# Patient Record
Sex: FEMALE | Race: WHITE | Employment: UNEMPLOYED | URBAN - METROPOLITAN AREA
[De-identification: names, ages, dates, MRNs, and addresses within clinical notes are randomized per-mention and may not be internally consistent; named-entity substitution may affect disease eponyms.]

---

## 2017-02-27 RX ORDER — DILTIAZEM HYDROCHLORIDE 360 MG/1
CAPSULE, EXTENDED RELEASE ORAL
Qty: 90 CAPSULE | Refills: 0 | Status: SHIPPED | OUTPATIENT
Start: 2017-02-27 | End: 2017-06-28 | Stop reason: SDUPTHER

## 2017-04-03 RX ORDER — HYDROCHLOROTHIAZIDE 50 MG/1
50 TABLET ORAL DAILY
Qty: 90 TABLET | Refills: 0 | Status: SHIPPED | OUTPATIENT
Start: 2017-04-03 | End: 2017-06-28 | Stop reason: SDUPTHER

## 2017-04-03 RX ORDER — LEVOTHYROXINE SODIUM 125 MCG
125 TABLET ORAL DAILY
Qty: 90 TABLET | Refills: 0 | Status: SHIPPED | OUTPATIENT
Start: 2017-04-03 | End: 2017-10-18 | Stop reason: SDUPTHER

## 2017-05-08 RX ORDER — LEVOTHYROXINE SODIUM 125 MCG
125 TABLET ORAL DAILY
Qty: 90 TABLET | Refills: 0 | Status: SHIPPED | OUTPATIENT
Start: 2017-05-08 | End: 2017-07-27 | Stop reason: SDUPTHER

## 2017-06-28 RX ORDER — HYDROCHLOROTHIAZIDE 50 MG/1
50 TABLET ORAL DAILY
Qty: 90 TABLET | Refills: 0 | Status: SHIPPED | OUTPATIENT
Start: 2017-06-28 | End: 2017-10-08 | Stop reason: SDUPTHER

## 2017-06-28 RX ORDER — DILTIAZEM HYDROCHLORIDE 360 MG/1
CAPSULE, EXTENDED RELEASE ORAL
Qty: 90 CAPSULE | Refills: 0 | Status: SHIPPED | OUTPATIENT
Start: 2017-06-28 | End: 2017-10-02 | Stop reason: SDUPTHER

## 2017-07-27 RX ORDER — LEVOTHYROXINE SODIUM 125 MCG
TABLET ORAL
Qty: 30 TABLET | Refills: 0 | Status: SHIPPED | OUTPATIENT
Start: 2017-07-27 | End: 2017-10-18

## 2017-09-27 RX ORDER — HYDROCHLOROTHIAZIDE 50 MG/1
TABLET ORAL
Qty: 90 TABLET | Refills: 0 | OUTPATIENT
Start: 2017-09-27

## 2017-09-27 RX ORDER — DILTIAZEM HYDROCHLORIDE 360 MG/1
CAPSULE, EXTENDED RELEASE ORAL
Qty: 90 CAPSULE | Refills: 0 | OUTPATIENT
Start: 2017-09-27

## 2017-10-02 DIAGNOSIS — I10 ESSENTIAL HYPERTENSION: Primary | ICD-10-CM

## 2017-10-02 RX ORDER — DILTIAZEM HYDROCHLORIDE 360 MG/1
CAPSULE, EXTENDED RELEASE ORAL
Qty: 30 CAPSULE | Refills: 0 | Status: SHIPPED | OUTPATIENT
Start: 2017-10-02 | End: 2017-10-18 | Stop reason: SDUPTHER

## 2017-10-02 NOTE — TELEPHONE ENCOUNTER
Pts  called. She is completely out of her diltiazem and is wondering why rxn was denied. Told him pt has not been seen in over a year. Sent in a 1 month supply of her diltiazem and advised she needs to be seen ASAP.

## 2017-10-09 RX ORDER — HYDROCHLOROTHIAZIDE 50 MG/1
50 TABLET ORAL DAILY
Qty: 90 TABLET | Refills: 0 | Status: SHIPPED | OUTPATIENT
Start: 2017-10-09 | End: 2017-10-18 | Stop reason: SDUPTHER

## 2017-10-18 ENCOUNTER — OFFICE VISIT (OUTPATIENT)
Dept: FAMILY MEDICINE CLINIC | Age: 57
End: 2017-10-18

## 2017-10-18 VITALS
BODY MASS INDEX: 38.45 KG/M2 | HEIGHT: 63 IN | HEART RATE: 66 BPM | WEIGHT: 217 LBS | OXYGEN SATURATION: 98 % | DIASTOLIC BLOOD PRESSURE: 78 MMHG | SYSTOLIC BLOOD PRESSURE: 120 MMHG

## 2017-10-18 DIAGNOSIS — E03.9 HYPOTHYROIDISM, UNSPECIFIED TYPE: ICD-10-CM

## 2017-10-18 DIAGNOSIS — I10 ESSENTIAL HYPERTENSION: Primary | ICD-10-CM

## 2017-10-18 PROCEDURE — 99214 OFFICE O/P EST MOD 30 MIN: CPT | Performed by: FAMILY MEDICINE

## 2017-10-18 RX ORDER — DILTIAZEM HYDROCHLORIDE 360 MG/1
360 CAPSULE, EXTENDED RELEASE ORAL DAILY
Qty: 90 CAPSULE | Refills: 1 | Status: SHIPPED | OUTPATIENT
Start: 2017-10-18 | End: 2018-04-15 | Stop reason: SDUPTHER

## 2017-10-18 RX ORDER — HYDROCHLOROTHIAZIDE 50 MG/1
50 TABLET ORAL DAILY
Qty: 90 TABLET | Refills: 1 | Status: SHIPPED | OUTPATIENT
Start: 2017-10-18 | End: 2018-06-28 | Stop reason: SDUPTHER

## 2017-10-18 RX ORDER — GABAPENTIN 300 MG/1
900 CAPSULE ORAL 3 TIMES DAILY
Refills: 2 | COMMUNITY
Start: 2017-09-27

## 2017-10-18 RX ORDER — TRIAMCINOLONE ACETONIDE 1 MG/G
CREAM TOPICAL
Qty: 30 G | Refills: 1 | Status: SHIPPED | OUTPATIENT
Start: 2017-10-18 | End: 2018-12-18

## 2017-10-18 RX ORDER — LEVOTHYROXINE SODIUM 125 MCG
125 TABLET ORAL DAILY
Qty: 90 TABLET | Refills: 1 | Status: SHIPPED | OUTPATIENT
Start: 2017-10-18 | End: 2018-04-15 | Stop reason: SDUPTHER

## 2017-10-18 NOTE — PROGRESS NOTES
1 capsule by mouth daily  -     metoprolol tartrate (LOPRESSOR) 25 MG tablet; Take 1 tablet by mouth 2 times daily  -     SYNTHROID 125 MCG tablet; Take 1 tablet by mouth daily  -     triamcinolone (KENALOG) 0.1 % cream; Apply topically 2 times daily.

## 2017-10-19 DIAGNOSIS — I10 ESSENTIAL HYPERTENSION: ICD-10-CM

## 2017-10-19 DIAGNOSIS — E03.9 HYPOTHYROIDISM, UNSPECIFIED TYPE: ICD-10-CM

## 2017-10-19 LAB
A/G RATIO: 1.8 (ref 1.1–2.2)
ALBUMIN SERPL-MCNC: 4.1 G/DL (ref 3.4–5)
ALP BLD-CCNC: 68 U/L (ref 40–129)
ALT SERPL-CCNC: 14 U/L (ref 10–40)
ANION GAP SERPL CALCULATED.3IONS-SCNC: 16 MMOL/L (ref 3–16)
AST SERPL-CCNC: 14 U/L (ref 15–37)
BILIRUB SERPL-MCNC: 0.3 MG/DL (ref 0–1)
BUN BLDV-MCNC: 20 MG/DL (ref 7–20)
CALCIUM SERPL-MCNC: 9.8 MG/DL (ref 8.3–10.6)
CHLORIDE BLD-SCNC: 102 MMOL/L (ref 99–110)
CHOLESTEROL, TOTAL: 223 MG/DL (ref 0–199)
CO2: 24 MMOL/L (ref 21–32)
CREAT SERPL-MCNC: 1 MG/DL (ref 0.6–1.1)
GFR AFRICAN AMERICAN: >60
GFR NON-AFRICAN AMERICAN: 57
GLOBULIN: 2.3 G/DL
GLUCOSE BLD-MCNC: 91 MG/DL (ref 70–99)
HDLC SERPL-MCNC: 53 MG/DL (ref 40–60)
LDL CHOLESTEROL CALCULATED: 141 MG/DL
POTASSIUM SERPL-SCNC: 4.3 MMOL/L (ref 3.5–5.1)
SODIUM BLD-SCNC: 142 MMOL/L (ref 136–145)
TOTAL PROTEIN: 6.4 G/DL (ref 6.4–8.2)
TRIGL SERPL-MCNC: 144 MG/DL (ref 0–150)
TSH SERPL DL<=0.05 MIU/L-ACNC: 0.45 UIU/ML (ref 0.27–4.2)
VLDLC SERPL CALC-MCNC: 29 MG/DL

## 2017-10-20 LAB
ESTIMATED AVERAGE GLUCOSE: 111.2 MG/DL
HBA1C MFR BLD: 5.5 %

## 2018-01-02 RX ORDER — HYDROCHLOROTHIAZIDE 50 MG/1
50 TABLET ORAL DAILY
Qty: 90 TABLET | Refills: 1 | Status: SHIPPED | OUTPATIENT
Start: 2018-01-02 | End: 2019-09-23 | Stop reason: SDUPTHER

## 2018-04-16 RX ORDER — DILTIAZEM HYDROCHLORIDE 360 MG/1
360 CAPSULE, EXTENDED RELEASE ORAL DAILY
Qty: 90 CAPSULE | Refills: 0 | Status: SHIPPED | OUTPATIENT
Start: 2018-04-16 | End: 2018-07-19 | Stop reason: SDUPTHER

## 2018-04-16 RX ORDER — LEVOTHYROXINE SODIUM 125 MCG
125 TABLET ORAL DAILY
Qty: 90 TABLET | Refills: 0 | Status: SHIPPED | OUTPATIENT
Start: 2018-04-16 | End: 2018-07-23 | Stop reason: SDUPTHER

## 2018-06-28 RX ORDER — HYDROCHLOROTHIAZIDE 50 MG/1
50 TABLET ORAL DAILY
Qty: 90 TABLET | Refills: 0 | Status: SHIPPED | OUTPATIENT
Start: 2018-06-28 | End: 2018-09-28 | Stop reason: SDUPTHER

## 2018-07-19 RX ORDER — DILTIAZEM HYDROCHLORIDE 360 MG/1
360 CAPSULE, EXTENDED RELEASE ORAL DAILY
Qty: 90 CAPSULE | Refills: 0 | Status: SHIPPED | OUTPATIENT
Start: 2018-07-19 | End: 2018-10-18 | Stop reason: SDUPTHER

## 2018-07-19 NOTE — TELEPHONE ENCOUNTER
Last OV - 10/18/2017  Return in about 1 year (around 10/18/2018)  Next OV - No pending appointments   Last filled - 4/16/2018

## 2018-07-23 RX ORDER — LEVOTHYROXINE SODIUM 125 MCG
125 TABLET ORAL DAILY
Qty: 90 TABLET | Refills: 0 | Status: SHIPPED | OUTPATIENT
Start: 2018-07-23 | End: 2018-10-18 | Stop reason: SDUPTHER

## 2018-09-28 RX ORDER — HYDROCHLOROTHIAZIDE 50 MG/1
50 TABLET ORAL DAILY
Qty: 90 TABLET | Refills: 0 | Status: SHIPPED | OUTPATIENT
Start: 2018-09-28 | End: 2018-12-18 | Stop reason: SDUPTHER

## 2018-10-18 RX ORDER — LEVOTHYROXINE SODIUM 125 MCG
125 TABLET ORAL DAILY
Qty: 90 TABLET | Refills: 0 | Status: SHIPPED | OUTPATIENT
Start: 2018-10-18 | End: 2019-01-09

## 2018-10-18 RX ORDER — DILTIAZEM HYDROCHLORIDE 360 MG/1
360 CAPSULE, EXTENDED RELEASE ORAL DAILY
Qty: 90 CAPSULE | Refills: 0 | Status: SHIPPED | OUTPATIENT
Start: 2018-10-18 | End: 2019-01-15 | Stop reason: SDUPTHER

## 2018-10-18 NOTE — TELEPHONE ENCOUNTER
Last Fill 7/23/18  Last Office Visit 10/18/17   Return in about 1 year (around 10/18/2018).    No Pending Appointments

## 2018-11-13 ENCOUNTER — TELEPHONE (OUTPATIENT)
Dept: FAMILY MEDICINE CLINIC | Age: 58
End: 2018-11-13

## 2018-11-13 NOTE — TELEPHONE ENCOUNTER
Patient was exercising at the gym around 11:45 this morning and had a four foot pole fall on her lower abdomen when she was trying to stand up. She immediately urinated on herself. She is experiencing back pain, lower abdominal pressure, painful urination and difficulty standing up (is not able to stand straight). Patient states the pain is severe. Apologized and advised the patient that her best option would be to go to the ER to be physically evaluated. Patient understood.

## 2018-11-13 NOTE — TELEPHONE ENCOUNTER
Pt would like to come in for an appt and would like to come in today. Pt states that she was leaning back with a weight bar (no weights on it) and fell back. Pt states she urinated on herself. I asked the pt if the bar fell on her, one time she said yes and the other time she said no. I'm not really clear on what happened. Pt unwilling to see a NP and doesn't want to go to the ER due to a lot of flu pt's being there. Pt last seen 2017. Please call and advise.

## 2018-12-13 ENCOUNTER — TELEPHONE (OUTPATIENT)
Dept: FAMILY MEDICINE CLINIC | Age: 58
End: 2018-12-13

## 2018-12-13 DIAGNOSIS — S32.040D CLOSED COMPRESSION FRACTURE OF L4 LUMBAR VERTEBRA WITH ROUTINE HEALING, SUBSEQUENT ENCOUNTER: Primary | ICD-10-CM

## 2018-12-13 NOTE — TELEPHONE ENCOUNTER
Pt's spouse calling for the following  Order(s) BONE DENSITY TEST   Reason -PATIENT'S ORTHOPEDIC DOCTOR IS REQUESTING SHE HAS THE TEST DONE ? Also wanted to inform PCP that patient was  Dx -Compression Fx of L-1 Lumbar ?   Please call patient back and advise

## 2018-12-18 ENCOUNTER — OFFICE VISIT (OUTPATIENT)
Dept: FAMILY MEDICINE CLINIC | Age: 58
End: 2018-12-18
Payer: COMMERCIAL

## 2018-12-18 VITALS
OXYGEN SATURATION: 98 % | BODY MASS INDEX: 34.09 KG/M2 | DIASTOLIC BLOOD PRESSURE: 70 MMHG | HEIGHT: 63 IN | WEIGHT: 192.4 LBS | SYSTOLIC BLOOD PRESSURE: 100 MMHG | HEART RATE: 70 BPM

## 2018-12-18 DIAGNOSIS — R73.03 PREDIABETES: ICD-10-CM

## 2018-12-18 DIAGNOSIS — E03.9 HYPOTHYROIDISM, UNSPECIFIED TYPE: ICD-10-CM

## 2018-12-18 DIAGNOSIS — I10 ESSENTIAL HYPERTENSION: ICD-10-CM

## 2018-12-18 DIAGNOSIS — R51.9 ACUTE NONINTRACTABLE HEADACHE, UNSPECIFIED HEADACHE TYPE: Primary | ICD-10-CM

## 2018-12-18 PROCEDURE — 99214 OFFICE O/P EST MOD 30 MIN: CPT | Performed by: FAMILY MEDICINE

## 2018-12-18 RX ORDER — BUPROPION HYDROCHLORIDE 300 MG/1
300 TABLET ORAL
COMMUNITY
Start: 2018-03-29

## 2018-12-18 RX ORDER — DULOXETIN HYDROCHLORIDE 60 MG/1
120 CAPSULE, DELAYED RELEASE ORAL DAILY
COMMUNITY
Start: 2011-12-11

## 2018-12-18 RX ORDER — METFORMIN HYDROCHLORIDE 500 MG/1
2000 TABLET, EXTENDED RELEASE ORAL
COMMUNITY
Start: 2018-03-29

## 2018-12-18 RX ORDER — LANOLIN ALCOHOL/MO/W.PET/CERES
3 CREAM (GRAM) TOPICAL
COMMUNITY

## 2018-12-18 ASSESSMENT — ENCOUNTER SYMPTOMS: TROUBLE SWALLOWING: 0

## 2018-12-18 NOTE — PROGRESS NOTES
adenopathy present. She has no cervical adenopathy. Neurological: She is alert and oriented to person, place, and time. No cranial nerve deficit. Skin: Skin is warm and dry. No rash noted. She is not diaphoretic. Psychiatric: She has a normal mood and affect. Her behavior is normal. Judgment and thought content normal.       Assessment:       Diagnosis Orders   1. Acute nonintractable headache, unspecified headache type     2. Essential hypertension  Lipid Panel    Comprehensive Metabolic Panel   3. Hypothyroidism, unspecified type  TSH without Reflex   4. Prediabetes  Hemoglobin A1C    Comprehensive Metabolic Panel          Plan:      Sachi Kenny was seen today for follow-up from hospital.    Diagnoses and all orders for this visit:    Acute nonintractable headache, unspecified headache type  Resolving. Criteria for emergent care reviewed. To er if worse  differential diagnosis reviewed  Essential hypertension  -     Lipid Panel; Future  -     Comprehensive Metabolic Panel; Future  The current medical regimen is effective;  continue present plan and medications. Hypothyroidism, unspecified type  -     TSH without Reflex; Future  Rechecking to see if stable  Prediabetes  -     Hemoglobin A1C; Future  -     Comprehensive Metabolic Panel;  Future  Has been stable with diet and exercise           Nohemi Barcenas MD

## 2018-12-19 ENCOUNTER — TELEPHONE (OUTPATIENT)
Dept: FAMILY MEDICINE CLINIC | Age: 58
End: 2018-12-19

## 2018-12-19 ENCOUNTER — HOSPITAL ENCOUNTER (OUTPATIENT)
Dept: GENERAL RADIOLOGY | Age: 58
Discharge: HOME | End: 2018-12-19
Payer: COMMERCIAL

## 2018-12-19 DIAGNOSIS — S32.040D CLOSED COMPRESSION FRACTURE OF L4 LUMBAR VERTEBRA WITH ROUTINE HEALING, SUBSEQUENT ENCOUNTER: ICD-10-CM

## 2018-12-19 PROCEDURE — 77080 DXA BONE DENSITY AXIAL: CPT

## 2018-12-20 RX ORDER — HYDROCHLOROTHIAZIDE 50 MG/1
50 TABLET ORAL DAILY
Qty: 90 TABLET | Refills: 0 | Status: SHIPPED | OUTPATIENT
Start: 2018-12-20 | End: 2019-03-16 | Stop reason: SDUPTHER

## 2019-01-03 DIAGNOSIS — I10 ESSENTIAL HYPERTENSION: ICD-10-CM

## 2019-01-03 DIAGNOSIS — R73.03 PREDIABETES: ICD-10-CM

## 2019-01-03 DIAGNOSIS — E03.9 HYPOTHYROIDISM, UNSPECIFIED TYPE: ICD-10-CM

## 2019-01-03 LAB
A/G RATIO: 1.7 (ref 1.1–2.2)
ALBUMIN SERPL-MCNC: 4.3 G/DL (ref 3.4–5)
ALP BLD-CCNC: 80 U/L (ref 40–129)
ALT SERPL-CCNC: 14 U/L (ref 10–40)
ANION GAP SERPL CALCULATED.3IONS-SCNC: 11 MMOL/L (ref 3–16)
AST SERPL-CCNC: 9 U/L (ref 15–37)
BILIRUB SERPL-MCNC: <0.2 MG/DL (ref 0–1)
BUN BLDV-MCNC: 16 MG/DL (ref 7–20)
CALCIUM SERPL-MCNC: 10.1 MG/DL (ref 8.3–10.6)
CHLORIDE BLD-SCNC: 100 MMOL/L (ref 99–110)
CHOLESTEROL, TOTAL: 225 MG/DL (ref 0–199)
CO2: 29 MMOL/L (ref 21–32)
CREAT SERPL-MCNC: 0.8 MG/DL (ref 0.6–1.1)
GFR AFRICAN AMERICAN: >60
GFR NON-AFRICAN AMERICAN: >60
GLOBULIN: 2.5 G/DL
GLUCOSE BLD-MCNC: 102 MG/DL (ref 70–99)
HDLC SERPL-MCNC: 46 MG/DL (ref 40–60)
LDL CHOLESTEROL CALCULATED: 149 MG/DL
POTASSIUM SERPL-SCNC: 4.1 MMOL/L (ref 3.5–5.1)
SODIUM BLD-SCNC: 140 MMOL/L (ref 136–145)
TOTAL PROTEIN: 6.8 G/DL (ref 6.4–8.2)
TRIGL SERPL-MCNC: 152 MG/DL (ref 0–150)
TSH SERPL DL<=0.05 MIU/L-ACNC: 5.75 UIU/ML (ref 0.27–4.2)
VLDLC SERPL CALC-MCNC: 30 MG/DL

## 2019-01-04 LAB
ESTIMATED AVERAGE GLUCOSE: 114 MG/DL
HBA1C MFR BLD: 5.6 %

## 2019-01-09 ENCOUNTER — TELEPHONE (OUTPATIENT)
Dept: FAMILY MEDICINE CLINIC | Age: 59
End: 2019-01-09

## 2019-01-09 RX ORDER — LEVOTHYROXINE SODIUM 137 MCG
137 TABLET ORAL DAILY
Qty: 30 TABLET | Refills: 2 | Status: SHIPPED | OUTPATIENT
Start: 2019-01-09 | End: 2019-04-02 | Stop reason: SDUPTHER

## 2019-01-15 RX ORDER — DILTIAZEM HYDROCHLORIDE 360 MG/1
360 CAPSULE, EXTENDED RELEASE ORAL DAILY
Qty: 90 CAPSULE | Refills: 1 | Status: SHIPPED | OUTPATIENT
Start: 2019-01-15 | End: 2019-07-18 | Stop reason: SDUPTHER

## 2019-01-15 RX ORDER — LEVOTHYROXINE SODIUM 125 MCG
125 TABLET ORAL DAILY
Qty: 90 TABLET | Refills: 1 | Status: SHIPPED | OUTPATIENT
Start: 2019-01-15 | End: 2020-01-07 | Stop reason: SDUPTHER

## 2019-03-18 RX ORDER — HYDROCHLOROTHIAZIDE 50 MG/1
50 TABLET ORAL DAILY
Qty: 90 TABLET | Refills: 1 | Status: SHIPPED | OUTPATIENT
Start: 2019-03-18 | End: 2019-09-23 | Stop reason: SDUPTHER

## 2019-04-02 RX ORDER — LEVOTHYROXINE SODIUM 137 MCG
TABLET ORAL
Qty: 30 TABLET | Refills: 0 | Status: SHIPPED | OUTPATIENT
Start: 2019-04-02 | End: 2019-05-01 | Stop reason: SDUPTHER

## 2019-04-02 NOTE — TELEPHONE ENCOUNTER
Last OV 12/18/2018   Next OV Visit date not found  Last Fill 1/15/2019    Last Thyroid   Lab Results   Component Value Date    TSH 5.75 01/03/2019    FT3 3.1 10/15/2012    A1NVPRQ 1.03 05/29/2014    T4FREE 1.5 05/29/2014    U8UTGUT 5.2 08/19/2010

## 2019-05-01 RX ORDER — LEVOTHYROXINE SODIUM 137 MCG
TABLET ORAL
Qty: 30 TABLET | Refills: 0 | Status: SHIPPED | OUTPATIENT
Start: 2019-05-01 | End: 2019-05-29 | Stop reason: SDUPTHER

## 2019-05-29 RX ORDER — LEVOTHYROXINE SODIUM 137 MCG
TABLET ORAL
Qty: 30 TABLET | Refills: 0 | Status: SHIPPED | OUTPATIENT
Start: 2019-05-29 | End: 2019-06-25 | Stop reason: SDUPTHER

## 2019-05-29 NOTE — TELEPHONE ENCOUNTER
Medication:   Requested Prescriptions     Pending Prescriptions Disp Refills    SYNTHROID 137 MCG tablet [Pharmacy Med Name: SYNTHROID 0.137MG (137MCG) TABLETS] 30 tablet 0     Sig: TAKE 1 TABLET BY MOUTH ONCE DAILY       Last Filled:  5. 1.2019 #30    Patient Phone Number: [phone] (home)     Last appt: 12/18/2018   Next appt: Visit date not found    Last Thyroid:   Lab Results   Component Value Date    TSH 5.75 01/03/2019    FT3 3.1 10/15/2012    R4XISSW 1.03 05/29/2014    T4FREE 1.5 05/29/2014    Y7ABQTZ 5.2 08/19/2010

## 2019-06-26 RX ORDER — LEVOTHYROXINE SODIUM 137 MCG
137 TABLET ORAL DAILY
Qty: 30 TABLET | Refills: 2 | Status: SHIPPED | OUTPATIENT
Start: 2019-06-26 | End: 2019-09-08 | Stop reason: SDUPTHER

## 2019-07-19 RX ORDER — DILTIAZEM HYDROCHLORIDE 360 MG/1
360 CAPSULE, EXTENDED RELEASE ORAL DAILY
Qty: 90 CAPSULE | Refills: 0 | Status: SHIPPED | OUTPATIENT
Start: 2019-07-19 | End: 2019-10-23 | Stop reason: SDUPTHER

## 2019-09-10 RX ORDER — LEVOTHYROXINE SODIUM 137 MCG
137 TABLET ORAL DAILY
Qty: 30 TABLET | Refills: 2 | Status: SHIPPED | OUTPATIENT
Start: 2019-09-10 | End: 2019-12-17 | Stop reason: SDUPTHER

## 2019-09-23 RX ORDER — HYDROCHLOROTHIAZIDE 50 MG/1
50 TABLET ORAL DAILY
Qty: 90 TABLET | Refills: 0 | Status: SHIPPED | OUTPATIENT
Start: 2019-09-23 | End: 2019-12-23

## 2019-10-23 RX ORDER — DILTIAZEM HYDROCHLORIDE 360 MG/1
360 CAPSULE, EXTENDED RELEASE ORAL DAILY
Qty: 90 CAPSULE | Refills: 1 | Status: SHIPPED | OUTPATIENT
Start: 2019-10-23 | End: 2020-04-21

## 2019-12-17 RX ORDER — LEVOTHYROXINE SODIUM 137 MCG
137 TABLET ORAL DAILY
Qty: 30 TABLET | Refills: 0 | Status: SHIPPED | OUTPATIENT
Start: 2019-12-17 | End: 2021-02-12

## 2019-12-23 RX ORDER — HYDROCHLOROTHIAZIDE 50 MG/1
50 TABLET ORAL DAILY
Qty: 90 TABLET | Refills: 1 | Status: SHIPPED | OUTPATIENT
Start: 2019-12-23 | End: 2020-05-18

## 2020-01-07 ENCOUNTER — OFFICE VISIT (OUTPATIENT)
Dept: FAMILY MEDICINE CLINIC | Age: 60
End: 2020-01-07
Payer: COMMERCIAL

## 2020-01-07 VITALS
OXYGEN SATURATION: 97 % | HEART RATE: 58 BPM | DIASTOLIC BLOOD PRESSURE: 68 MMHG | SYSTOLIC BLOOD PRESSURE: 120 MMHG | HEIGHT: 63 IN | WEIGHT: 220.4 LBS | BODY MASS INDEX: 39.05 KG/M2

## 2020-01-07 LAB
BILIRUBIN, POC: NEGATIVE
BLOOD URINE, POC: NORMAL
CLARITY, POC: NORMAL
COLOR, POC: NORMAL
GLUCOSE URINE, POC: NEGATIVE
KETONES, POC: NEGATIVE
LEUKOCYTE EST, POC: NORMAL
NITRITE, POC: POSITIVE
PH, POC: 6.5
PROTEIN, POC: NEGATIVE
SPECIFIC GRAVITY, POC: 1.02
UROBILINOGEN, POC: 0.2

## 2020-01-07 PROCEDURE — 81002 URINALYSIS NONAUTO W/O SCOPE: CPT | Performed by: FAMILY MEDICINE

## 2020-01-07 PROCEDURE — 99214 OFFICE O/P EST MOD 30 MIN: CPT | Performed by: FAMILY MEDICINE

## 2020-01-07 RX ORDER — NITROFURANTOIN 25; 75 MG/1; MG/1
100 CAPSULE ORAL 2 TIMES DAILY
Qty: 14 CAPSULE | Refills: 0 | Status: SHIPPED | OUTPATIENT
Start: 2020-01-07 | End: 2020-01-14

## 2020-01-07 RX ORDER — LEVOTHYROXINE SODIUM 125 MCG
125 TABLET ORAL DAILY
Qty: 90 TABLET | Refills: 3 | Status: SHIPPED | OUTPATIENT
Start: 2020-01-07 | End: 2020-09-16 | Stop reason: SDUPTHER

## 2020-01-07 ASSESSMENT — PATIENT HEALTH QUESTIONNAIRE - PHQ9
SUM OF ALL RESPONSES TO PHQ QUESTIONS 1-9: 0
SUM OF ALL RESPONSES TO PHQ9 QUESTIONS 1 & 2: 0
2. FEELING DOWN, DEPRESSED OR HOPELESS: 0
1. LITTLE INTEREST OR PLEASURE IN DOING THINGS: 0
SUM OF ALL RESPONSES TO PHQ QUESTIONS 1-9: 0

## 2020-01-07 NOTE — PROGRESS NOTES
Subjective:      Patient ID: Errol Haney is a 60 y.o. female. NAIF   Pt is a of 60 y.o. female comes in today with   Chief Complaint   Patient presents with    Follow-up     Pateint complain of burnig urination      Notes dysuria for a few days. Taking thyroid and blood pressure meds as prescribed. preDM on previous bloodwork    Past Medical History:Reviewed  Medications:Reviewed. Allergies   Allergen Reactions    Sulfa Antibiotics       Social hx:Reviewed. Social History     Tobacco Use   Smoking Status Never Smoker   Smokeless Tobacco Never Used     Review of Systems  Vitals:    01/07/20 0933   BP: 120/68   Site: Left Upper Arm   Position: Sitting   Cuff Size: Large Adult   Pulse: 58   SpO2: 97%   Weight: 220 lb 6.4 oz (100 kg)   Height: 5' 3\" (1.6 m)      Objective:   Physical Exam  Constitutional:       General: She is not in acute distress. Appearance: She is well-developed. She is not diaphoretic. HENT:      Head: Normocephalic and atraumatic. Eyes:      General: No scleral icterus. Neck:      Musculoskeletal: Normal range of motion and neck supple. Thyroid: No thyroid mass or thyromegaly. Trachea: No tracheal deviation. Cardiovascular:      Rate and Rhythm: Normal rate and regular rhythm. Heart sounds: Normal heart sounds. No murmur. Pulmonary:      Effort: Pulmonary effort is normal.      Breath sounds: Normal breath sounds. Lymphadenopathy:      Head:      Right side of head: No submandibular adenopathy. Left side of head: No submandibular adenopathy. Cervical: No cervical adenopathy. Skin:     General: Skin is warm and dry. Findings: No rash. Neurological:      Mental Status: She is alert and oriented to person, place, and time. Cranial Nerves: No cranial nerve deficit. Psychiatric:         Behavior: Behavior normal.         Thought Content:  Thought content normal.         Judgment: Judgment normal.         Assessment:       Diagnosis Orders   1. Essential hypertension  Lipid Panel    Comprehensive Metabolic Panel   2. Hypothyroidism, unspecified type  TSH without Reflex   3. Prediabetes  Hemoglobin A1C   4. Acute cystitis without hematuria  URINE CULTURE          Plan:     Vilma Marinelli was seen today for follow-up. Diagnoses and all orders for this visit:    Essential hypertension  -     Lipid Panel; Future  -     Comprehensive Metabolic Panel; Future  The current medical regimen is effective;  continue present plan and medications. Hypothyroidism, unspecified type  -     TSH without Reflex;  Future  Recheck to see if stable on med  Prediabetes  -     Hemoglobin A1C; Future  Has been stable on metformin  Acute cystitis without hematuria  -     URINE CULTURE  Covering with antibiotics pending culture           Guicho Grissom MD

## 2020-01-08 DIAGNOSIS — E03.9 HYPOTHYROIDISM, UNSPECIFIED TYPE: ICD-10-CM

## 2020-01-08 DIAGNOSIS — R73.03 PREDIABETES: ICD-10-CM

## 2020-01-08 DIAGNOSIS — I10 ESSENTIAL HYPERTENSION: ICD-10-CM

## 2020-01-08 LAB
A/G RATIO: 1.9 (ref 1.1–2.2)
ALBUMIN SERPL-MCNC: 4.4 G/DL (ref 3.4–5)
ALP BLD-CCNC: 79 U/L (ref 40–129)
ALT SERPL-CCNC: 14 U/L (ref 10–40)
ANION GAP SERPL CALCULATED.3IONS-SCNC: 17 MMOL/L (ref 3–16)
AST SERPL-CCNC: 11 U/L (ref 15–37)
BILIRUB SERPL-MCNC: <0.2 MG/DL (ref 0–1)
BUN BLDV-MCNC: 12 MG/DL (ref 7–20)
CALCIUM SERPL-MCNC: 10.1 MG/DL (ref 8.3–10.6)
CHLORIDE BLD-SCNC: 99 MMOL/L (ref 99–110)
CHOLESTEROL, TOTAL: 217 MG/DL (ref 0–199)
CO2: 25 MMOL/L (ref 21–32)
CREAT SERPL-MCNC: 1 MG/DL (ref 0.6–1.2)
GFR AFRICAN AMERICAN: >60
GFR NON-AFRICAN AMERICAN: 56
GLOBULIN: 2.3 G/DL
GLUCOSE BLD-MCNC: 101 MG/DL (ref 70–99)
HDLC SERPL-MCNC: 57 MG/DL (ref 40–60)
LDL CHOLESTEROL CALCULATED: 136 MG/DL
POTASSIUM SERPL-SCNC: 4.6 MMOL/L (ref 3.5–5.1)
SODIUM BLD-SCNC: 141 MMOL/L (ref 136–145)
TOTAL PROTEIN: 6.7 G/DL (ref 6.4–8.2)
TRIGL SERPL-MCNC: 119 MG/DL (ref 0–150)
TSH SERPL DL<=0.05 MIU/L-ACNC: 0.29 UIU/ML (ref 0.27–4.2)
VLDLC SERPL CALC-MCNC: 24 MG/DL

## 2020-01-09 LAB
ESTIMATED AVERAGE GLUCOSE: 96.8 MG/DL
HBA1C MFR BLD: 5 %

## 2020-01-10 LAB
ORGANISM: ABNORMAL
URINE CULTURE, ROUTINE: ABNORMAL
URINE CULTURE, ROUTINE: ABNORMAL

## 2020-04-21 RX ORDER — DILTIAZEM HYDROCHLORIDE 360 MG/1
360 CAPSULE, EXTENDED RELEASE ORAL DAILY
Qty: 90 CAPSULE | Refills: 1 | Status: SHIPPED | OUTPATIENT
Start: 2020-04-21 | End: 2020-11-11 | Stop reason: SDUPTHER

## 2020-04-21 NOTE — TELEPHONE ENCOUNTER
Last Fill 10/23/19  Last Office Visit 1/7/20  Return in about 6 months (around 7/7/2020).    No Pending Appointments

## 2020-05-18 RX ORDER — HYDROCHLOROTHIAZIDE 50 MG/1
50 TABLET ORAL DAILY
Qty: 90 TABLET | Refills: 1 | Status: SHIPPED | OUTPATIENT
Start: 2020-05-18 | End: 2020-12-15 | Stop reason: SDUPTHER

## 2020-05-18 NOTE — TELEPHONE ENCOUNTER
Last Fill 12/23/19  Last Office Visit 1/7/20  Return in about 6 months (around 7/7/2020).    No Pending Appointments

## 2020-06-10 ENCOUNTER — TELEPHONE (OUTPATIENT)
Dept: FAMILY MEDICINE CLINIC | Age: 60
End: 2020-06-10

## 2020-08-18 ENCOUNTER — TELEPHONE (OUTPATIENT)
Dept: FAMILY MEDICINE CLINIC | Age: 60
End: 2020-08-18

## 2020-09-16 ENCOUNTER — TELEPHONE (OUTPATIENT)
Dept: FAMILY MEDICINE CLINIC | Age: 60
End: 2020-09-16

## 2020-09-16 RX ORDER — LEVOTHYROXINE SODIUM 0.12 MG/1
125 TABLET ORAL DAILY
Qty: 90 TABLET | Refills: 3 | Status: SHIPPED | OUTPATIENT
Start: 2020-09-16 | End: 2021-01-12

## 2020-09-16 NOTE — TELEPHONE ENCOUNTER
Patients Synthroid PA was denied: \"requested medication and/or diagnosis are not a covered benefit and are excluded from coverage in accordance with the terms and conditions of your plan benefit. \"    Do you happen to know why patient can have brand name only? Could not find it in patients chart. Will submit additional request with explanation.

## 2020-09-16 NOTE — TELEPHONE ENCOUNTER
Called patient, she just had back surgery and is in rehab. She is not aware of there being any interaction with generic, and is agreeable to try it. Pending.

## 2020-10-01 ENCOUNTER — OFFICE VISIT (OUTPATIENT)
Dept: FAMILY MEDICINE CLINIC | Age: 60
End: 2020-10-01
Payer: COMMERCIAL

## 2020-10-01 VITALS
HEART RATE: 68 BPM | DIASTOLIC BLOOD PRESSURE: 84 MMHG | WEIGHT: 233.8 LBS | TEMPERATURE: 97.2 F | HEIGHT: 63 IN | BODY MASS INDEX: 41.43 KG/M2 | OXYGEN SATURATION: 97 % | SYSTOLIC BLOOD PRESSURE: 118 MMHG

## 2020-10-01 LAB
BILIRUBIN, POC: NEGATIVE
BLOOD URINE, POC: NEGATIVE
CLARITY, POC: ABNORMAL
COLOR, POC: YELLOW
GLUCOSE URINE, POC: NEGATIVE
KETONES, POC: ABNORMAL
LEUKOCYTE EST, POC: ABNORMAL
NITRITE, POC: NEGATIVE
PH, POC: 7
PROTEIN, POC: NEGATIVE
SPECIFIC GRAVITY, POC: 1.02
UROBILINOGEN, POC: ABNORMAL

## 2020-10-01 PROCEDURE — 90686 IIV4 VACC NO PRSV 0.5 ML IM: CPT | Performed by: NURSE PRACTITIONER

## 2020-10-01 PROCEDURE — 90471 IMMUNIZATION ADMIN: CPT | Performed by: NURSE PRACTITIONER

## 2020-10-01 PROCEDURE — 3017F COLORECTAL CA SCREEN DOC REV: CPT | Performed by: NURSE PRACTITIONER

## 2020-10-01 PROCEDURE — 1036F TOBACCO NON-USER: CPT | Performed by: NURSE PRACTITIONER

## 2020-10-01 PROCEDURE — 81002 URINALYSIS NONAUTO W/O SCOPE: CPT | Performed by: NURSE PRACTITIONER

## 2020-10-01 PROCEDURE — G8482 FLU IMMUNIZE ORDER/ADMIN: HCPCS | Performed by: NURSE PRACTITIONER

## 2020-10-01 PROCEDURE — G8427 DOCREV CUR MEDS BY ELIG CLIN: HCPCS | Performed by: NURSE PRACTITIONER

## 2020-10-01 PROCEDURE — 99213 OFFICE O/P EST LOW 20 MIN: CPT | Performed by: NURSE PRACTITIONER

## 2020-10-01 PROCEDURE — G8417 CALC BMI ABV UP PARAM F/U: HCPCS | Performed by: NURSE PRACTITIONER

## 2020-10-01 RX ORDER — NITROFURANTOIN 25; 75 MG/1; MG/1
CAPSULE ORAL
COMMUNITY
Start: 2020-09-23 | End: 2020-10-01 | Stop reason: ALTCHOICE

## 2020-10-01 RX ORDER — BREXPIPRAZOLE 1 MG/1
TABLET ORAL
COMMUNITY
Start: 2020-09-01

## 2020-10-01 RX ORDER — CEPHALEXIN 500 MG/1
500 CAPSULE ORAL 4 TIMES DAILY
Qty: 28 CAPSULE | Refills: 0 | Status: SHIPPED | OUTPATIENT
Start: 2020-10-01 | End: 2020-10-08

## 2020-10-01 RX ORDER — HYDROXYZINE 50 MG/1
TABLET, FILM COATED ORAL
COMMUNITY
Start: 2020-07-15 | End: 2020-12-04

## 2020-10-01 ASSESSMENT — ENCOUNTER SYMPTOMS
CONSTIPATION: 0
ABDOMINAL PAIN: 0
DIARRHEA: 0

## 2020-10-01 NOTE — PROGRESS NOTES
[facility]  [address] [place] [address] AT [facility] [id number]  Loc: [phone]         10/1/2020     Zuly Barboza (:  1960) is a 63 y.o. female, here for evaluation of the following medical concerns:    Chief Complaint   Patient presents with    Urinary Tract Infection        HPI   S/p lumbar fusion  2008- dumbell fell on her; did epidural, brace x 6 months, aquatic PT x 3 sessions- had compression fracture  Recently had back surgery- has helped  Walking is good    UTI symptoms  Took Macrobid- finished today  Has some burning  + urinary frequency  No urgency  No blood in urine  No fevers or chills  No abdominal pain  + constipation in the hospital; 1 diarrhea after starting abx  Cranberry juice, water, small amount of diet pepsi    Review of Systems   Constitutional: Negative for activity change, appetite change, chills and fever. Gastrointestinal: Negative for abdominal pain, constipation and diarrhea. Genitourinary: Positive for dysuria and frequency. Negative for hematuria, urgency and vaginal discharge. Prior to Visit Medications    Medication Sig Taking?  Authorizing Provider   REXULTI 1 MG TABS tablet TK 1/2 TO 1 T PO EVERY MORNING Yes Historical Provider, MD   hydrOXYzine (ATARAX) 50 MG tablet TAKE 1 TABLET BY MOUTH EVERY MORNING AND 2 TABLETS BY MOUTH AT BEDTIME Yes Historical Provider, MD   L-methylfolate Calcium 15 MG TABS TK 1 T PO QAM Yes Historical Provider, MD   cephALEXin (KEFLEX) 500 MG capsule Take 1 capsule by mouth 4 times daily for 7 days Yes Christina Song APRN - MIREYA   hydroCHLOROthiazide (HYDRODIURIL) 50 MG tablet TAKE 1 TABLET BY MOUTH DAILY Yes Latoya Klein MD   dilTIAZem (CARDIZEM CD) 360 MG extended release capsule Take 1 capsule by mouth daily Yes Latoya Klein MD   metoprolol tartrate (LOPRESSOR) 25 MG tablet Take 1 tablet by mouth 2 times daily Yes Latoya Klein MD   SYNTHROID 137 MCG tablet Take 1 tablet by mouth Daily Yes Chata Godoy MD   DULoxetine (CYMBALTA) 60 MG extended release capsule Take 120 mg by mouth daily Yes Historical Provider, MD   melatonin 3 MG TABS tablet Take 3 mg by mouth Yes Historical Provider, MD   metFORMIN (GLUCOPHAGE-XR) 500 MG extended release tablet Take 2,000 mg by mouth Yes Historical Provider, MD   buPROPion (WELLBUTRIN XL) 300 MG extended release tablet Take 300 mg by mouth Yes Historical Provider, MD   gabapentin (NEURONTIN) 300 MG capsule Take 900 mg by mouth 3 times daily Yes Historical Provider, MD   TAZTIA  MG SR capsule TAKE 1 CAPSULE BY MOUTH EVERY DAY Yes Chata Godoy MD   zonisamide (ZONEGRAN) 100 MG capsule Take 1 capsule by mouth daily. Yes Historical Provider, MD   lamotrigine (LAMICTAL) 200 MG tablet  Yes Historical Provider, MD   levothyroxine (SYNTHROID) 125 MCG tablet Take 1 tablet by mouth daily  Patient not taking: Reported on 10/1/2020  Chata Godoy MD        Social History     Tobacco Use    Smoking status: Never Smoker    Smokeless tobacco: Never Used   Substance Use Topics    Alcohol use: Yes     Comment: occasionally        Vitals:    10/01/20 1532   BP: 118/84   Site: Left Upper Arm   Position: Sitting   Cuff Size: Large Adult   Pulse: 68   Temp: 97.2 °F (36.2 °C)   TempSrc: Infrared   SpO2: 97%   Weight: 233 lb 12.8 oz (106.1 kg)   Height: 5' 3\" (1.6 m)     Estimated body mass index is 41.42 kg/m² as calculated from the following:    Height as of this encounter: 5' 3\" (1.6 m). Weight as of this encounter: 233 lb 12.8 oz (106.1 kg). Physical Exam  Vitals signs reviewed. Constitutional:       Appearance: Normal appearance. HENT:      Head: Normocephalic. Cardiovascular:      Rate and Rhythm: Normal rate and regular rhythm. Pulses: Normal pulses.       Heart sounds: Normal heart sounds, S1 normal and S2 normal.   Pulmonary:      Effort: Pulmonary effort is normal.      Breath sounds: Normal breath sounds and air

## 2020-10-01 NOTE — PROGRESS NOTES
Vaccine Information Sheet, \"Influenza - Inactivated\"  given to Major Vaughan, or parent/legal guardian of  Major Vaughan and verbalized understanding. Patient responses:    Have you ever had a reaction to a flu vaccine? No  Do you have any current illness? No  Have you ever had Guillian Hoolehua Syndrome? No  Do you have a serious allergy to any of the follow: Neomycin, Polymyxin, Thimerosal, eggs or egg products? No    Flu vaccine given per order. Please see immunization tab. Risks and benefits explained. Current VIS given.

## 2020-10-02 LAB — URINE CULTURE, ROUTINE: NORMAL

## 2020-10-04 PROBLEM — Z98.1 S/P LUMBAR FUSION: Status: ACTIVE | Noted: 2020-10-04

## 2020-11-11 RX ORDER — DILTIAZEM HYDROCHLORIDE 360 MG/1
360 CAPSULE, EXTENDED RELEASE ORAL DAILY
Qty: 90 CAPSULE | Refills: 1 | Status: SHIPPED | OUTPATIENT
Start: 2020-11-11 | End: 2021-05-03

## 2020-12-04 ENCOUNTER — VIRTUAL VISIT (OUTPATIENT)
Dept: FAMILY MEDICINE CLINIC | Age: 60
End: 2020-12-04
Payer: COMMERCIAL

## 2020-12-04 PROCEDURE — G8482 FLU IMMUNIZE ORDER/ADMIN: HCPCS | Performed by: FAMILY MEDICINE

## 2020-12-04 PROCEDURE — 1036F TOBACCO NON-USER: CPT | Performed by: FAMILY MEDICINE

## 2020-12-04 PROCEDURE — G8427 DOCREV CUR MEDS BY ELIG CLIN: HCPCS | Performed by: FAMILY MEDICINE

## 2020-12-04 PROCEDURE — 3017F COLORECTAL CA SCREEN DOC REV: CPT | Performed by: FAMILY MEDICINE

## 2020-12-04 PROCEDURE — G8417 CALC BMI ABV UP PARAM F/U: HCPCS | Performed by: FAMILY MEDICINE

## 2020-12-04 PROCEDURE — 99213 OFFICE O/P EST LOW 20 MIN: CPT | Performed by: FAMILY MEDICINE

## 2020-12-04 RX ORDER — NITROFURANTOIN 25; 75 MG/1; MG/1
100 CAPSULE ORAL 2 TIMES DAILY
Qty: 14 CAPSULE | Refills: 0 | Status: SHIPPED | OUTPATIENT
Start: 2020-12-04 | End: 2020-12-11

## 2020-12-04 NOTE — PROGRESS NOTES
2020    TELEHEALTH EVALUATION -- Audio/Visual (During DKTME-69 public health emergency)    HPI:    Yrn Peng (:  1960) has requested an audio/video evaluation for the following concern(s):    Chief Complaint   Patient presents with    Check-Up     kidney infection possibly     Dysuria started yesterday. Some low back pain. No n/v. No flank pain. On blood pressure meds as prescribed. No side effects  Taking thyroid med as prescribed. Review of Systems   Constitutional: Negative for fever. Prior to Visit Medications    Medication Sig Taking? Authorizing Provider   dilTIAZem (CARDIZEM CD) 360 MG extended release capsule Take 1 capsule by mouth daily Yes Cain Dillard MD   REXULTI 1 MG TABS tablet TK 1/2 TO 1 T PO EVERY MORNING Yes Historical Provider, MD   L-methylfolate Calcium 15 MG TABS TK 1 T PO QAM Yes Historical Provider, MD   levothyroxine (SYNTHROID) 125 MCG tablet Take 1 tablet by mouth daily Yes Cain Dillard MD   metoprolol tartrate (LOPRESSOR) 25 MG tablet Take 1 tablet by mouth 2 times daily Yes Cain Dillard MD   melatonin 3 MG TABS tablet Take 3 mg by mouth Yes Historical Provider, MD   metFORMIN (GLUCOPHAGE-XR) 500 MG extended release tablet Take 2,000 mg by mouth Yes Historical Provider, MD   gabapentin (NEURONTIN) 300 MG capsule Take 900 mg by mouth 3 times daily Yes Historical Provider, MD   TAZTIA  MG SR capsule TAKE 1 CAPSULE BY MOUTH EVERY DAY Yes Cain Dillard MD   zonisamide (ZONEGRAN) 100 MG capsule Take 1 capsule by mouth daily.  Yes Historical Provider, MD   lamotrigine (LAMICTAL) 200 MG tablet  Yes Historical Provider, MD   hydroCHLOROthiazide (HYDRODIURIL) 50 MG tablet TAKE 1 TABLET BY MOUTH DAILY  Patient not taking: Reported on 2020  Cain Dillard MD   SYNTHROID 137 MCG tablet Take 1 tablet by mouth Daily  Patient not taking: Reported on 2020  Cain Dillard MD   DULoxetine (CYMBALTA) 60 MG extended release capsule Take 120 mg by mouth daily  Historical Provider, MD   buPROPion (WELLBUTRIN XL) 300 MG extended release tablet Take 300 mg by mouth  Historical Provider, MD       Social History     Tobacco Use    Smoking status: Never Smoker    Smokeless tobacco: Never Used   Substance Use Topics    Alcohol use: Yes     Comment: occasionally    Drug use: No        Allergies   Allergen Reactions    Ciprofloxacin     Sulfa Antibiotics        PHYSICAL EXAMINATION:  [ INSTRUCTIONS:  \"[x]\" Indicates a positive item  \"[]\" Indicates a negative item  -- DELETE ALL ITEMS NOT EXAMINED]  Vital Signs: (As obtained by patient/caregiver or practitioner observation)    Blood pressure-  Heart rate-    Respiratory rate-    Temperature-  Pulse oximetry-     Constitutional: [x] Appears well-developed and well-nourished [x] No apparent distress      [] Abnormal-   Mental status  [] Alert and awake  [] Oriented to person/place/time []Able to follow commands      Eyes:  EOM    []  Normal  [] Abnormal-  Sclera  []  Normal  [] Abnormal -         Discharge []  None visible  [] Abnormal -    HENT:   [] Normocephalic, atraumatic.   [] Abnormal   [] Mouth/Throat: Mucous membranes are moist.     External Ears [] Normal  [] Abnormal-     Neck: [] No visualized mass     Pulmonary/Chest: [] Respiratory effort normal.  [] No visualized signs of difficulty breathing or respiratory distress        [] Abnormal-      Musculoskeletal:   [] Normal gait with no signs of ataxia         [] Normal range of motion of neck        [] Abnormal-       Neurological:        [] No Facial Asymmetry (Cranial nerve 7 motor function) (limited exam to video visit)          [] No gaze palsy        [] Abnormal-         Skin:        [] No significant exanthematous lesions or discoloration noted on facial skin         [] Abnormal-            Psychiatric:       [] Normal Affect [] No Hallucinations        [] Abnormal-     Other pertinent observable physical exam findings- ASSESSMENT/PLAN:    Krystle Lawson was seen today for check-up. Diagnoses and all orders for this visit:    Acute cystitis without hematuria  Covering with antibiotics   Essential hypertension  -     Basic Metabolic Panel; Future  -     Lipid Panel; Future  The current medical regimen is effective;  continue present plan and medications. Prediabetes  -     Hemoglobin A1C; Future  Recheck to see if stable  Hypothyroidism, unspecified type  -     TSH without Reflex; Future  tsh to see if stable  Other orders  -     nitrofurantoin, macrocrystal-monohydrate, (MACROBID) 100 MG capsule; Take 1 capsule by mouth 2 times daily for 7 days         No follow-ups on file. Chase Lugo is a 63 y.o. female being evaluated by a Virtual Visit (video visit) encounter to address concerns as mentioned above. A caregiver was present when appropriate. Due to this being a TeleHealth encounter (During UPYVE-70 public health emergency), evaluation of the following organ systems was limited: Vitals/Constitutional/EENT/Resp/CV/GI//MS/Neuro/Skin/Heme-Lymph-Imm. Pursuant to the emergency declaration under the [address] [place] [address], [address] [place] [address] authority and the [employer] and [employer] Act, this Virtual Visit was conducted with patient's (and/or legal guardian's) consent, to reduce the patient's risk of exposure to COVID-19 and provide necessary medical care. The patient (and/or legal guardian) has also been advised to contact this office for worsening conditions or problems, and seek emergency medical treatment and/or call 911 if deemed necessary. Patient identification was verified at the start of the visit: Yes    Total time spent on this encounter: Not billed by time    Services were provided through a video synchronous discussion virtually to substitute for in-person clinic visit. Patient and provider were located at their individual homes.     --Metro Meche Beryle Anis, MD on 12/4/2020 at 3:06 PM    An electronic signature was used to authenticate this note.

## 2020-12-05 RX ORDER — CEPHALEXIN 500 MG/1
500 CAPSULE ORAL 3 TIMES DAILY
Qty: 21 CAPSULE | Refills: 0 | Status: SHIPPED | OUTPATIENT
Start: 2020-12-05 | End: 2020-12-12

## 2020-12-05 NOTE — PROGRESS NOTES
pts  contacted on call provider. macrobid is making her very nauseated and has in the past as well. Requested rx for keflex, which she has tolerated ok in the past for UTI. New rx sent. Declined rx for zofran.

## 2020-12-15 RX ORDER — HYDROCHLOROTHIAZIDE 50 MG/1
50 TABLET ORAL DAILY
Qty: 90 TABLET | Refills: 1 | Status: SHIPPED | OUTPATIENT
Start: 2020-12-15 | End: 2021-03-29

## 2020-12-23 ENCOUNTER — TELEPHONE (OUTPATIENT)
Dept: FAMILY MEDICINE CLINIC | Age: 60
End: 2020-12-23

## 2020-12-23 ENCOUNTER — NURSE TRIAGE (OUTPATIENT)
Dept: OTHER | Facility: CLINIC | Age: 60
End: 2020-12-23

## 2020-12-23 NOTE — TELEPHONE ENCOUNTER
Patient calling to say she may have broken a couple of ribs and is in severe pain. Office is closed until 12/28. Offered patient an appointment with overflow tomorrow or next week with us. Patient declined. Advised patient she may want to go to urgent care or ER. Patient is going to call [place] [id number] [facility] [place] [address] [facility] [place].

## 2020-12-23 NOTE — TELEPHONE ENCOUNTER
Bryan Bella over dog cage. Hurts to breathe in under breast bone. Landed on nose, and front of side. Reason for Disposition   High-risk adult (e.g., age > 61, osteoporosis, chronic steroid use)    Answer Assessment - Initial Assessment Questions  1. MECHANISM: \"How did the injury happen? \"      See above    2. ONSET: \"When did the injury happen? \" (Minutes or hours ago)      Monday    3. LOCATION: \"Where on the chest is the injury located? \"      Right    4. APPEARANCE: \"What does the injury look like? \"      Bruising    5. BLEEDING: \"Is there any bleeding now? If so, ask: How long has it been bleeding? \"      Denies    6. SEVERITY: Velia Ouch difficulty with breathing? \"      Denies    7. SIZE: For cuts, bruises, or swelling, ask: \"How large is it? \" (e.g., inches or centimeters)      Na    8. PAIN: \"Is there pain? \" If so, ask: \"How bad is the pain? \"   (e.g., Scale 1-10; or mild, moderate, severe)      5/10    9. TETANUS: For any breaks in the skin, ask: \"When was the last tetanus booster? \"      Na    10. PREGNANCY: \"Is there any chance you are pregnant? \" \"When was your last menstrual period? \"        Na    Protocols used: CHEST INJURY-ADULT-[place]    Patient called pre-service center [facility]) to schedule appointment, with red flag complaint, transferred to RN access for triage. See above questions and answers. Caller talking full sentences without any distress on phone. Discussed disposition and patient agreeable. Discussed potential consequences for not following disposition recommendation. Aware to call back with any concerns or persistent, worsening, or new symptoms develop. Warm transfer to [facility] scheduling for appointment. Attention Provider: Thank you for allowing me to participate in the care of your patient. The  patient was connected to triage in response to information provided to the [facility]. Please do not respond through this encounter as the response is not directed to a shared pool.       If unable to be seen in pcp office, pt urged to go to urgent care. Pt verbalized understanding.

## 2020-12-28 NOTE — TELEPHONE ENCOUNTER
Patient informed. She does not like taking presciption medication, managing pain with tylenol. She was just concerned that her lungs could've been punctured. She is not having the same pain with breathing though, has improved. Advised her she would need to go to ER if she started having shortness of breath or difficulty breathing, she understood. Told her to let us know if there is anything we can help her with.

## 2020-12-28 NOTE — TELEPHONE ENCOUNTER
Not critical since whether ribs are broken or not treatment is the same.    Needs to be seen for stronger pain med regardless

## 2020-12-28 NOTE — TELEPHONE ENCOUNTER
I called to check on the patient, she went to urgent care and had x-rays but they never contacted her regarding the results. She was having difficulty breathing. She has attempted to contact them several times and is unable to obtain the results. I told her that we would reach out to the clinic to try and get the results. I spoke with Serenity at [facility] and she is having the x-ray tech review it and call us with the results.

## 2020-12-28 NOTE — TELEPHONE ENCOUNTER
Urgent care called back, the machine malfunctioned, unable to have x-rays read for approximately 7 more days. Pain is located on the right side of her breast bone. [place] over a dog gate. Severe pain when she breathes in. Started muscle relaxer and steroid. Do you want her to have x-rays taken again? Urgent care was not confident in how long it would take to have the images read, stated it would be at least 7 days possibly more.

## 2021-01-12 RX ORDER — LEVOTHYROXINE SODIUM 125 MCG
TABLET ORAL
Qty: 90 TABLET | Refills: 1 | Status: SHIPPED | OUTPATIENT
Start: 2021-01-12 | End: 2021-06-16

## 2021-01-12 NOTE — TELEPHONE ENCOUNTER
Levothyroxine - 9/16/20  Metoprolol - 1/7/20  Last Office Visit 12/4/20  Pending Appointments 1/14/21

## 2021-02-12 ENCOUNTER — VIRTUAL VISIT (OUTPATIENT)
Dept: FAMILY MEDICINE CLINIC | Age: 61
End: 2021-02-12
Payer: COMMERCIAL

## 2021-02-12 DIAGNOSIS — I10 ESSENTIAL HYPERTENSION: Primary | ICD-10-CM

## 2021-02-12 DIAGNOSIS — N30.00 ACUTE CYSTITIS WITHOUT HEMATURIA: ICD-10-CM

## 2021-02-12 DIAGNOSIS — E03.9 HYPOTHYROIDISM, UNSPECIFIED TYPE: ICD-10-CM

## 2021-02-12 PROCEDURE — 1036F TOBACCO NON-USER: CPT | Performed by: FAMILY MEDICINE

## 2021-02-12 PROCEDURE — 3017F COLORECTAL CA SCREEN DOC REV: CPT | Performed by: FAMILY MEDICINE

## 2021-02-12 PROCEDURE — G8482 FLU IMMUNIZE ORDER/ADMIN: HCPCS | Performed by: FAMILY MEDICINE

## 2021-02-12 PROCEDURE — G8427 DOCREV CUR MEDS BY ELIG CLIN: HCPCS | Performed by: FAMILY MEDICINE

## 2021-02-12 PROCEDURE — 99214 OFFICE O/P EST MOD 30 MIN: CPT | Performed by: FAMILY MEDICINE

## 2021-02-12 PROCEDURE — G8417 CALC BMI ABV UP PARAM F/U: HCPCS | Performed by: FAMILY MEDICINE

## 2021-02-12 RX ORDER — NITROFURANTOIN 25; 75 MG/1; MG/1
100 CAPSULE ORAL 2 TIMES DAILY
Qty: 14 CAPSULE | Refills: 0 | Status: SHIPPED | OUTPATIENT
Start: 2021-02-12 | End: 2021-12-10 | Stop reason: SDUPTHER

## 2021-02-12 RX ORDER — METHOCARBAMOL 750 MG/1
TABLET, FILM COATED ORAL
COMMUNITY
Start: 2020-12-24

## 2021-02-12 SDOH — ECONOMIC STABILITY: TRANSPORTATION INSECURITY
IN THE PAST 12 MONTHS, HAS THE LACK OF TRANSPORTATION KEPT YOU FROM MEDICAL APPOINTMENTS OR FROM GETTING MEDICATIONS?: NO

## 2021-02-12 SDOH — ECONOMIC STABILITY: TRANSPORTATION INSECURITY
IN THE PAST 12 MONTHS, HAS LACK OF TRANSPORTATION KEPT YOU FROM MEETINGS, WORK, OR FROM GETTING THINGS NEEDED FOR DAILY LIVING?: NO

## 2021-02-12 SDOH — ECONOMIC STABILITY: FOOD INSECURITY: WITHIN THE PAST 12 MONTHS, THE FOOD YOU BOUGHT JUST DIDN'T LAST AND YOU DIDN'T HAVE MONEY TO GET MORE.: NEVER TRUE

## 2021-02-12 SDOH — ECONOMIC STABILITY: INCOME INSECURITY: HOW HARD IS IT FOR YOU TO PAY FOR THE VERY BASICS LIKE FOOD, HOUSING, MEDICAL CARE, AND HEATING?: NOT HARD AT ALL

## 2021-02-12 ASSESSMENT — PATIENT HEALTH QUESTIONNAIRE - PHQ9
SUM OF ALL RESPONSES TO PHQ QUESTIONS 1-9: 0
2. FEELING DOWN, DEPRESSED OR HOPELESS: 0
SUM OF ALL RESPONSES TO PHQ9 QUESTIONS 1 & 2: 0
1. LITTLE INTEREST OR PLEASURE IN DOING THINGS: 0

## 2021-02-12 NOTE — PROGRESS NOTES
Shailesh Horta is a 63 y.o. female being evaluated by a Virtual Visit (video visit) encounter to address concerns as mentioned above. A caregiver was present when appropriate. Due to this being a TeleHealth encounter (During AODAD-02 public health emergency), evaluation of the following organ systems was limited: Vitals/Constitutional/EENT/Resp/CV/GI//MS/Neuro/Skin/Heme-Lymph-Imm. Pursuant to the emergency declaration under the [address] [place] [address] and the [employer] and [employer] Act, this Virtual Visit was conducted with patient's (and/or legal guardian's) consent, to reduce the patient's risk of exposure to COVID-19 and provide necessary medical care. The patient (and/or legal guardian) has also been advised to contact this office for worsening conditions or problems, and seek emergency medical treatment and/or call 911 if deemed necessary. Patient identification was verified at the start of the visit: Yes    Services were provided through a video synchronous discussion virtually to substitute for in-person clinic visit. Patient was located at home and provider was located in office or at home. An electronic signature was used to authenticate this note.     --Saintclair Beards, MD

## 2021-02-23 ENCOUNTER — VIRTUAL VISIT (OUTPATIENT)
Dept: FAMILY MEDICINE CLINIC | Age: 61
End: 2021-02-23
Payer: COMMERCIAL

## 2021-02-23 DIAGNOSIS — B02.9 HERPES ZOSTER WITHOUT COMPLICATION: Primary | ICD-10-CM

## 2021-02-23 PROCEDURE — G8417 CALC BMI ABV UP PARAM F/U: HCPCS | Performed by: NURSE PRACTITIONER

## 2021-02-23 PROCEDURE — G8427 DOCREV CUR MEDS BY ELIG CLIN: HCPCS | Performed by: NURSE PRACTITIONER

## 2021-02-23 PROCEDURE — 99213 OFFICE O/P EST LOW 20 MIN: CPT | Performed by: NURSE PRACTITIONER

## 2021-02-23 PROCEDURE — 1036F TOBACCO NON-USER: CPT | Performed by: NURSE PRACTITIONER

## 2021-02-23 PROCEDURE — 3017F COLORECTAL CA SCREEN DOC REV: CPT | Performed by: NURSE PRACTITIONER

## 2021-02-23 PROCEDURE — G8482 FLU IMMUNIZE ORDER/ADMIN: HCPCS | Performed by: NURSE PRACTITIONER

## 2021-02-23 RX ORDER — VALACYCLOVIR HYDROCHLORIDE 1 G/1
1000 TABLET, FILM COATED ORAL 3 TIMES DAILY
Qty: 21 TABLET | Refills: 0 | Status: SHIPPED | OUTPATIENT
Start: 2021-02-23 | End: 2021-03-02

## 2021-02-23 NOTE — PROGRESS NOTES
Jenaro Gaxiola (:  1960) is a 63 y.o. female,Established patient, here for evaluation of the following chief complaint(s): Rash (Patient concerned for Shingles)      ASSESSMENT/PLAN:  1. Herpes zoster without complication  Stable;  Begin valtrex. Discussed do not pop the vesicles. Can try cool compresses it bothersome. Handout attached to after visit summary.  -     valACYclovir (VALTREX) 1 g tablet; Take 1 tablet by mouth 3 times daily for 7 days, Disp-21 tablet, R-0Normal      Return if symptoms worsen or fail to improve. SUBJECTIVE/OBJECTIVE:  HPI   Symptoms started yesterday  Yesterday very itching with small areas  + tingling  Blisters came up on back  Popped them- clear fluid; rash has started lower right side, but can feel it coming up  More are coming up  No fevers or chills  + fatigue and worn down- can be normal for her  + stress    Review of Systems    Patient-Reported Vitals 2021   Patient-Reported Weight 180 lb   Patient-Reported Height 5' 3\"   Patient-Reported Systolic 204   Patient-Reported Diastolic 78   Patient-Reported Pulse 80        Physical Exam  Constitutional:       Appearance: Normal appearance. HENT:      Head: Normocephalic. Right Ear: External ear normal.      Left Ear: External ear normal.      Nose: Nose normal.   Pulmonary:      Effort: No respiratory distress. Skin:     Findings: Rash present. Rash is vesicular. Neurological:      Mental Status: She is alert.    Psychiatric:         Mood and Affect: Mood normal. Yanira Duarte is a 63 y.o. female being evaluated by a Virtual Visit (video visit) encounter to address concerns as mentioned above. A caregiver was present when appropriate. Due to this being a TeleHealth encounter (During RQU-35 public health emergency), evaluation of the following organ systems was limited: Vitals/Constitutional/EENT/Resp/CV/GI//MS/Neuro/Skin/Heme-Lymph-Imm. Pursuant to the emergency declaration under the [address] [place] [address] and the [employer] and [employer] Act, this Virtual Visit was conducted with patient's (and/or legal guardian's) consent, to reduce the patient's risk of exposure to COVID-19 and provide necessary medical care. The patient (and/or legal guardian) has also been advised to contact this office for worsening conditions or problems, and seek emergency medical treatment and/or call 911 if deemed necessary. Patient identification was verified at the start of the visit: Yes    Services were provided through a video synchronous discussion virtually to substitute for in-person clinic visit. Patient was located at home and provider was located in office or at home. An electronic signature was used to authenticate this note.     --Andrea Martinez, KEITH - CNP

## 2021-02-23 NOTE — PATIENT INSTRUCTIONS
Patient Education        Shingles: Care Instructions  Your Care Instructions     Shingles (herpes zoster) causes pain and a blistered rash. The rash can appear anywhere on the body but will be on only one side of the body, the left or right. It will be in a band, a strip, or a small area. The pain can be very severe. Shingles can also cause tingling or itching in the area of the rash. The blisters scab over after a few days and heal in 2 to 4 weeks. Medicines can help you feel better and may help prevent more serious problems caused by shingles. Shingles is caused by the same virus that causes chickenpox. When you have chickenpox, the virus gets into your nerve roots and stays there (becomes dormant) long after you get over the chickenpox. If the virus becomes active again, it can cause shingles. Follow-up care is a key part of your treatment and safety. Be sure to make and go to all appointments, and call your doctor if you are having problems. It's also a good idea to know your test results and keep a list of the medicines you take. How can you care for yourself at home? · Be safe with medicines. Take your medicines exactly as prescribed. Call your doctor if you think you are having a problem with your medicine. Antiviral medicine helps you get better faster. · Try not to scratch or pick at the blisters. They will crust over and fall off on their own if you leave them alone. · Put cool, wet cloths on the area to relieve pain and itching. You can also use calamine lotion. Try not to use so much lotion that it cakes and is hard to get off. · Put cornstarch or baking soda on the sores to help dry them out so they heal faster. · Do not use thick ointment, such as petroleum jelly, on the sores. This will keep them from drying and healing. · To help remove loose crusts, soak them in tap water. This can help decrease oozing, and dry and soothe the skin. · Take an over-the-counter pain medicine, such as acetaminophen (Tylenol), ibuprofen (Advil, Motrin), or naproxen (Aleve). Read and follow all instructions on the label. · Avoid close contact with people until the blisters have healed. It is very important for you to avoid contact with anyone who has never had chickenpox or the chickenpox vaccine. Pregnant women, young babies, and anyone else who has a hard time fighting infection (such as someone with HIV, diabetes, or cancer) is especially at risk. When should you call for help? Call your doctor now or seek immediate medical care if:    · You have a new or higher fever.     · You have a severe headache and a stiff neck.     · You lose the ability to think clearly.     · The rash spreads to your forehead, nose, eyes, or eyelids.     · You have eye pain, or your vision gets worse.     · You have new pain in your face, or you cannot move the muscles in your face.     · Blisters spread to new parts of your body. Watch closely for changes in your health, and be sure to contact your doctor if:    · The rash has not healed after 2 to 4 weeks.     · You still have pain after the rash has healed. Where can you learn more? Go to [email or web address] and sign in to your [employer] account. Sarahy Galo in the [facility] box to learn more about \"Shingles: Care Instructions. \"     If you do not have an account, please click on the \"Sign Up Now\" link. Current as of: February 11, 2020               Content Version: 12.6  © 4629-3233 [employer]. Care instructions adapted under license by [facility] ([facility]). If you have questions about a medical condition or this instruction, always ask your healthcare professional. [address] any warranty or liability for your use of this information.

## 2021-03-29 DIAGNOSIS — R73.03 PREDIABETES: ICD-10-CM

## 2021-03-29 DIAGNOSIS — I10 ESSENTIAL HYPERTENSION: ICD-10-CM

## 2021-03-29 DIAGNOSIS — E03.9 HYPOTHYROIDISM, UNSPECIFIED TYPE: ICD-10-CM

## 2021-03-29 LAB
ANION GAP SERPL CALCULATED.3IONS-SCNC: 12 MMOL/L (ref 3–16)
BUN BLDV-MCNC: 14 MG/DL (ref 7–20)
CALCIUM SERPL-MCNC: 9.4 MG/DL (ref 8.3–10.6)
CHLORIDE BLD-SCNC: 104 MMOL/L (ref 99–110)
CHOLESTEROL, TOTAL: 214 MG/DL (ref 0–199)
CO2: 27 MMOL/L (ref 21–32)
CREAT SERPL-MCNC: 0.9 MG/DL (ref 0.6–1.2)
GFR AFRICAN AMERICAN: >60
GFR NON-AFRICAN AMERICAN: >60
GLUCOSE BLD-MCNC: 107 MG/DL (ref 70–99)
HDLC SERPL-MCNC: 49 MG/DL (ref 40–60)
LDL CHOLESTEROL CALCULATED: 140 MG/DL
POTASSIUM SERPL-SCNC: 4 MMOL/L (ref 3.5–5.1)
SODIUM BLD-SCNC: 143 MMOL/L (ref 136–145)
TRIGL SERPL-MCNC: 126 MG/DL (ref 0–150)
TSH SERPL DL<=0.05 MIU/L-ACNC: 1.02 UIU/ML (ref 0.27–4.2)
VLDLC SERPL CALC-MCNC: 25 MG/DL

## 2021-03-29 RX ORDER — HYDROCHLOROTHIAZIDE 50 MG/1
50 TABLET ORAL DAILY
Qty: 90 TABLET | Refills: 1 | Status: SHIPPED | OUTPATIENT
Start: 2021-03-29 | End: 2021-11-29

## 2021-03-29 NOTE — TELEPHONE ENCOUNTER
Last OV 2/23/2021   Next OV Visit date not found    Requested Prescriptions     Pending Prescriptions Disp Refills    hydroCHLOROthiazide (HYDRODIURIL) 50 MG tablet [Pharmacy Med Name: HYDROCHLOROTHIAZIDE 50MG TABLETS] 90 tablet 1     Sig: TAKE 1 TABLET BY MOUTH DAILY

## 2021-03-30 LAB
ESTIMATED AVERAGE GLUCOSE: 114 MG/DL
HBA1C MFR BLD: 5.6 %

## 2021-03-31 ENCOUNTER — TELEPHONE (OUTPATIENT)
Dept: FAMILY MEDICINE CLINIC | Age: 61
End: 2021-03-31

## 2021-03-31 NOTE — TELEPHONE ENCOUNTER
Patient is concerned that her liver function was not checked on recent lab work due to the medications she is on. Please advise. Thanks.

## 2021-04-16 LAB — GLUCOSE BLD-MCNC: 83 MG/DL (ref 70–100)

## 2021-05-03 RX ORDER — DILTIAZEM HYDROCHLORIDE 360 MG/1
360 CAPSULE, EXTENDED RELEASE ORAL DAILY
Qty: 90 CAPSULE | Refills: 1 | Status: SHIPPED | OUTPATIENT
Start: 2021-05-03 | End: 2021-10-29

## 2021-06-15 NOTE — TELEPHONE ENCOUNTER
Medication:   Requested Prescriptions     Pending Prescriptions Disp Refills    SYNTHROID 125 MCG tablet [Pharmacy Med Name: SYNTHROID 0.125MG (125MCG) TABLETS] 90 tablet 1     Sig: TAKE 1 TABLET BY MOUTH DAILY        Last Filled: 1/12/21  Last appt: 2/23/2021   Next appt: NONE

## 2021-06-16 RX ORDER — LEVOTHYROXINE SODIUM 125 MCG
TABLET ORAL
Qty: 90 TABLET | Refills: 1 | Status: SHIPPED | OUTPATIENT
Start: 2021-06-16 | End: 2021-10-26

## 2021-07-12 NOTE — TELEPHONE ENCOUNTER
Medication:   Requested Prescriptions     Pending Prescriptions Disp Refills    metoprolol tartrate (LOPRESSOR) 25 MG tablet [Pharmacy Med Name: METOPROLOL TARTRATE 25MG TABLETS] 180 tablet 1     Sig: TAKE 1 TABLET BY MOUTH TWICE DAILY   Last Filled:  1/12/21  Last appt: 2/23/2021   Next appt: none

## 2021-07-27 ENCOUNTER — TELEPHONE (OUTPATIENT)
Dept: FAMILY MEDICINE CLINIC | Age: 61
End: 2021-07-27

## 2021-07-27 NOTE — TELEPHONE ENCOUNTER
I spoke with NP Lauren Tomas regarding patient, she stated that as long as her symptoms were not worsening that she can wait to see her surgeon tomorrow, if they were to become worse she should go to ER. I called and informed patient, she became upset. Denied appointment first thing in the morning with Dr. Jh Quinn. Patients  took the phone and expressed their dissatisfaction with the office and stated they would find another provider to see the patient. I apologized and explained that PCP was out of the office and the provider covering for him did not have any appointments today, which I had mentioned to her earlier on the phone and patient expressed interest in just waiting to see surgeon tomorrow. They stated that if they changed their mind and wanted to see Dr. Jh Quinn tomorrow they would call back. Most recent BP reading is 101/59.

## 2021-07-27 NOTE — TELEPHONE ENCOUNTER
Please call and advise BP questions regarding the pt's BP issues causing dizziness and nausea for a few days.      Low BP's 100/67 today,

## 2021-07-27 NOTE — TELEPHONE ENCOUNTER
Yes, she should come in or be evaluated today. Did she take too much BP medication? Has she not been drinking fluids? Is she bleeding from anywhere- dark, tarry , bloody stools?

## 2021-07-27 NOTE — TELEPHONE ENCOUNTER
Patient informed, agreeable to appointment tomorrow morning with Dr. Drake Arellano. Patient scheduled. Aware not to take any more BP medication until appointment.

## 2021-07-27 NOTE — TELEPHONE ENCOUNTER
Patient was calling the office back because she had not heard anything on if she needs to come in or not. Please advise.

## 2021-10-21 ENCOUNTER — OFFICE VISIT (OUTPATIENT)
Dept: FAMILY MEDICINE CLINIC | Age: 61
End: 2021-10-21
Payer: COMMERCIAL

## 2021-10-21 VITALS
RESPIRATION RATE: 16 BRPM | TEMPERATURE: 98.2 F | SYSTOLIC BLOOD PRESSURE: 125 MMHG | BODY MASS INDEX: 34.73 KG/M2 | HEART RATE: 86 BPM | HEIGHT: 63 IN | WEIGHT: 196 LBS | DIASTOLIC BLOOD PRESSURE: 80 MMHG | OXYGEN SATURATION: 98 %

## 2021-10-21 DIAGNOSIS — E03.9 HYPOTHYROIDISM, UNSPECIFIED TYPE: Primary | ICD-10-CM

## 2021-10-21 DIAGNOSIS — I10 PRIMARY HYPERTENSION: ICD-10-CM

## 2021-10-21 DIAGNOSIS — R73.03 PREDIABETES: ICD-10-CM

## 2021-10-21 DIAGNOSIS — R41.3 MEMORY LOSS: ICD-10-CM

## 2021-10-21 DIAGNOSIS — R11.2 NAUSEA AND VOMITING, INTRACTABILITY OF VOMITING NOT SPECIFIED, UNSPECIFIED VOMITING TYPE: ICD-10-CM

## 2021-10-21 PROCEDURE — 1036F TOBACCO NON-USER: CPT | Performed by: FAMILY MEDICINE

## 2021-10-21 PROCEDURE — G8417 CALC BMI ABV UP PARAM F/U: HCPCS | Performed by: FAMILY MEDICINE

## 2021-10-21 PROCEDURE — 99214 OFFICE O/P EST MOD 30 MIN: CPT | Performed by: FAMILY MEDICINE

## 2021-10-21 PROCEDURE — G8484 FLU IMMUNIZE NO ADMIN: HCPCS | Performed by: FAMILY MEDICINE

## 2021-10-21 PROCEDURE — G8427 DOCREV CUR MEDS BY ELIG CLIN: HCPCS | Performed by: FAMILY MEDICINE

## 2021-10-21 PROCEDURE — 3017F COLORECTAL CA SCREEN DOC REV: CPT | Performed by: FAMILY MEDICINE

## 2021-10-21 NOTE — PROGRESS NOTES
Lennox Card (:  1960) is a 57 y.o. female,Established patient, here for evaluation of the following chief complaint(s):  Memory Loss (x2 months ), Constipation (x1 month ), and Emesis (x3 weeks)         ASSESSMENT/PLAN:  Lisa Arreola was seen today for memory loss, constipation and emesis. Diagnoses and all orders for this visit:    Hypothyroidism, unspecified type  -     TSH with Reflex; Future  Has been stable on current dose. Due for blood work   Primary hypertension  -     Lipid Panel; Future  -     Comprehensive Metabolic Panel; Future  -     CBC; Future  -     TSH with Reflex; Future  The current medical regimen is effective;  continue present plan and medications. Prediabetes  -     VITAMIN B12 & FOLATE; Future  Recheck to see if stable with diet  Memory loss  -     CBC; Future  -     RPR REFLEX; Future  blood work to evaluate  Nausea and vomiting, intractability of vomiting not specified, unspecified vomiting type  -     US GALLBLADDER RUQ; Future  ultrasound to evaluate. differential diagnosis reviewed       No follow-ups on file. Subjective   SUBJECTIVE/OBJECTIVE:  HPI   Pt is a of 57 y.o. female comes in today with   Chief Complaint   Patient presents with    Memory Loss     x2 months     Constipation     x1 month     Emesis     x3 weeks     Having a lot of nausea   15-20 min after she eats  Started a few weeks ago. Vitals:    10/21/21 1100   BP: 125/80   Site: Right Upper Arm   Position: Sitting   Cuff Size: Medium Adult   Pulse: 86   Resp: 16   Temp: 98.2 °F (36.8 °C)   TempSrc: Temporal   SpO2: 98%   Weight: 230 lb (104.3 kg)   Height: 5' 3\" (1.6 m)        Review of Systems       Objective   Physical Exam         An electronic signature was used to authenticate this note.     --Julio Cesar Jose MD

## 2021-10-22 DIAGNOSIS — R73.03 PREDIABETES: ICD-10-CM

## 2021-10-22 DIAGNOSIS — E03.9 HYPOTHYROIDISM, UNSPECIFIED TYPE: ICD-10-CM

## 2021-10-22 DIAGNOSIS — R41.3 MEMORY LOSS: ICD-10-CM

## 2021-10-22 DIAGNOSIS — I10 PRIMARY HYPERTENSION: ICD-10-CM

## 2021-10-22 LAB
A/G RATIO: 1.9 (ref 1.1–2.2)
ALBUMIN SERPL-MCNC: 4.4 G/DL (ref 3.4–5)
ALP BLD-CCNC: 113 U/L (ref 40–129)
ALT SERPL-CCNC: 12 U/L (ref 10–40)
ANION GAP SERPL CALCULATED.3IONS-SCNC: 15 MMOL/L (ref 3–16)
AST SERPL-CCNC: 10 U/L (ref 15–37)
BILIRUB SERPL-MCNC: 0.3 MG/DL (ref 0–1)
BUN BLDV-MCNC: 13 MG/DL (ref 7–20)
CALCIUM SERPL-MCNC: 10 MG/DL (ref 8.3–10.6)
CHLORIDE BLD-SCNC: 101 MMOL/L (ref 99–110)
CHOLESTEROL, TOTAL: 218 MG/DL (ref 0–199)
CO2: 24 MMOL/L (ref 21–32)
CREAT SERPL-MCNC: 0.8 MG/DL (ref 0.6–1.2)
FOLATE: >20 NG/ML (ref 4.78–24.2)
GFR AFRICAN AMERICAN: >60
GFR NON-AFRICAN AMERICAN: >60
GLOBULIN: 2.3 G/DL
GLUCOSE BLD-MCNC: 89 MG/DL (ref 70–99)
HCT VFR BLD CALC: 35.8 % (ref 36–48)
HDLC SERPL-MCNC: 51 MG/DL (ref 40–60)
HEMOGLOBIN: 12.1 G/DL (ref 12–16)
LDL CHOLESTEROL CALCULATED: 143 MG/DL
MCH RBC QN AUTO: 30.8 PG (ref 26–34)
MCHC RBC AUTO-ENTMCNC: 33.7 G/DL (ref 31–36)
MCV RBC AUTO: 91.5 FL (ref 80–100)
PDW BLD-RTO: 14 % (ref 12.4–15.4)
PLATELET # BLD: 348 K/UL (ref 135–450)
PMV BLD AUTO: 8.4 FL (ref 5–10.5)
POTASSIUM SERPL-SCNC: 4.3 MMOL/L (ref 3.5–5.1)
RBC # BLD: 3.91 M/UL (ref 4–5.2)
SODIUM BLD-SCNC: 140 MMOL/L (ref 136–145)
T4 FREE: 1.4 NG/DL (ref 0.9–1.8)
TOTAL PROTEIN: 6.7 G/DL (ref 6.4–8.2)
TRIGL SERPL-MCNC: 118 MG/DL (ref 0–150)
TSH REFLEX: 0.16 UIU/ML (ref 0.27–4.2)
VITAMIN B-12: >2000 PG/ML (ref 211–911)
VLDLC SERPL CALC-MCNC: 24 MG/DL
WBC # BLD: 7.6 K/UL (ref 4–11)

## 2021-10-23 LAB
T3 TOTAL: 1.14 NG/ML (ref 0.8–2)
TOTAL SYPHILLIS IGG/IGM: NORMAL

## 2021-10-25 ENCOUNTER — PATIENT MESSAGE (OUTPATIENT)
Dept: FAMILY MEDICINE CLINIC | Age: 61
End: 2021-10-25

## 2021-10-25 ENCOUNTER — HOSPITAL ENCOUNTER (OUTPATIENT)
Dept: ULTRASOUND IMAGING | Age: 61
Discharge: HOME | End: 2021-10-25
Payer: COMMERCIAL

## 2021-10-25 DIAGNOSIS — E03.9 HYPOTHYROIDISM, UNSPECIFIED TYPE: Primary | ICD-10-CM

## 2021-10-25 DIAGNOSIS — R11.2 NAUSEA AND VOMITING, INTRACTABILITY OF VOMITING NOT SPECIFIED, UNSPECIFIED VOMITING TYPE: ICD-10-CM

## 2021-10-25 PROCEDURE — 76705 ECHO EXAM OF ABDOMEN: CPT

## 2021-10-26 ENCOUNTER — TELEPHONE (OUTPATIENT)
Dept: FAMILY MEDICINE CLINIC | Age: 61
End: 2021-10-26

## 2021-10-26 RX ORDER — ONDANSETRON 8 MG/1
8 TABLET, ORALLY DISINTEGRATING ORAL EVERY 8 HOURS PRN
Qty: 30 TABLET | Refills: 2 | Status: SHIPPED | OUTPATIENT
Start: 2021-10-26 | End: 2022-04-12 | Stop reason: SDUPTHER

## 2021-10-26 RX ORDER — LEVOTHYROXINE SODIUM 112 MCG
112 TABLET ORAL DAILY
Qty: 90 TABLET | Refills: 1 | Status: SHIPPED | OUTPATIENT
Start: 2021-10-26 | End: 2022-01-27

## 2021-10-26 NOTE — TELEPHONE ENCOUNTER
From: Waqas Booker  To: Sundeep Jhaveri MD  Sent: 10/25/2021 10:21 PM EDT  Subject: Test Results Question    Dr. Carolyn Duran,  Please call me on Tuesday on my cell phone. I am concerned about the gallbladder issue from the ultrasound and also the thyroid blood test. My number is [phone]. Thank you.  Fabby.

## 2021-10-26 NOTE — TELEPHONE ENCOUNTER
Patient called in and was read both her lab results and the [address] note that  put in. Patient was upset at the beginning of the call because she stated she was never contacted about these results. I did inform her that a MA put in a message stating we left a VM for her to call back so we could result on these. Than after she stated she started throwing up last night and would like to know if  can send something in for this. As far as the referral she stated her best friend's  is a surgeon and will speak with them on who they would recommend. I informed Mrs. Lui to give us a call with that information so we can put it into a referral. Mrs. Chapo Arreola would also like  to just send in a new thyroid medication for her with clear instructions on how to take them. Patient requested to be called back today by  because she is very worried about the whole situation.  I confirmed the patient's phone number as [phone]

## 2021-10-29 ENCOUNTER — TELEPHONE (OUTPATIENT)
Dept: FAMILY MEDICINE CLINIC | Age: 61
End: 2021-10-29

## 2021-10-29 DIAGNOSIS — K80.20 GALLSTONES: Primary | ICD-10-CM

## 2021-10-29 RX ORDER — DILTIAZEM HYDROCHLORIDE 360 MG/1
360 CAPSULE, EXTENDED RELEASE ORAL DAILY
Qty: 90 CAPSULE | Refills: 1 | Status: SHIPPED | OUTPATIENT
Start: 2021-10-29 | End: 2022-04-29

## 2021-10-29 NOTE — TELEPHONE ENCOUNTER
Medication:   Requested Prescriptions     Pending Prescriptions Disp Refills    dilTIAZem (CARDIZEM CD) 360 MG extended release capsule [Pharmacy Med Name: DILTIAZEM ER 360MG (24 HR/CD) CAPS] 90 capsule 1     Sig: TAKE 1 CAPSULE BY MOUTH DAILY        Last Filled:  05/03/2021    Patient Phone Number: [phone] (home)     Last appt: 10/21/2021   Next appt: Visit date not found    Last OARRS: No flowsheet data found.

## 2021-10-29 NOTE — TELEPHONE ENCOUNTER
Pt  stated she got a referral to GI. They choose to go through [address] [place] [address] and would like to get that referral faxed over.      Please advise     Phone # [phone]    Fax # [phone]

## 2021-11-29 RX ORDER — HYDROCHLOROTHIAZIDE 50 MG/1
50 TABLET ORAL DAILY
Qty: 90 TABLET | Refills: 1 | Status: SHIPPED | OUTPATIENT
Start: 2021-11-29 | End: 2022-07-05

## 2021-12-10 ENCOUNTER — TELEPHONE (OUTPATIENT)
Dept: FAMILY MEDICINE CLINIC | Age: 61
End: 2021-12-10

## 2021-12-10 RX ORDER — NITROFURANTOIN 25; 75 MG/1; MG/1
100 CAPSULE ORAL 2 TIMES DAILY
Qty: 14 CAPSULE | Refills: 0 | Status: SHIPPED | OUTPATIENT
Start: 2021-12-10 | End: 2021-12-23

## 2021-12-10 NOTE — TELEPHONE ENCOUNTER
Patient would like to know if  can send her in something for a kidney infection. She states she gets them often and is having surgery on Tuesday. Please advise.

## 2021-12-23 RX ORDER — NITROFURANTOIN 25; 75 MG/1; MG/1
CAPSULE ORAL
Qty: 14 CAPSULE | Refills: 0 | Status: SHIPPED | OUTPATIENT
Start: 2021-12-23 | End: 2022-03-31 | Stop reason: ALTCHOICE

## 2021-12-23 NOTE — TELEPHONE ENCOUNTER
Medication:   Requested Prescriptions     Pending Prescriptions Disp Refills    nitrofurantoin, macrocrystal-monohydrate, (MACROBID) 100 MG capsule [Pharmacy Med Name: NITROFURANTOIN MONO/MAC 100MG CAPS] 14 capsule 0     Sig: TAKE 1 CAPSULE BY MOUTH TWICE DAILY FOR 7 DAYS        Last Filled:      Patient Phone Number: [phone] (home)     Last appt: 10/21/2021   Next appt: Visit date not found    Last OARRS: No flowsheet data found.

## 2021-12-28 RX ORDER — LEVOTHYROXINE SODIUM 125 MCG
TABLET ORAL
Qty: 90 TABLET | Refills: 1 | Status: SHIPPED | OUTPATIENT
Start: 2021-12-28

## 2021-12-28 NOTE — TELEPHONE ENCOUNTER
Medication:   Requested Prescriptions     Pending Prescriptions Disp Refills    SYNTHROID 125 MCG tablet [Pharmacy Med Name: SYNTHROID 0.125MG (125MCG) TABLETS] 90 tablet 1     Sig: TAKE 1 TABLET BY MOUTH DAILY        Last Filled:  10/26/2021    Patient Phone Number: [phone] (home)     Last appt: 10/21/2021   Next appt: Visit date not found    Last OARRS: No flowsheet data found.

## 2022-01-27 RX ORDER — LEVOTHYROXINE SODIUM 112 MCG
112 TABLET ORAL DAILY
Qty: 90 TABLET | Refills: 1 | Status: SHIPPED | OUTPATIENT
Start: 2022-01-27 | End: 2022-08-04

## 2022-03-31 ENCOUNTER — TELEMEDICINE (OUTPATIENT)
Dept: FAMILY MEDICINE CLINIC | Age: 62
End: 2022-03-31
Payer: COMMERCIAL

## 2022-03-31 DIAGNOSIS — H92.01 RIGHT EAR PAIN: ICD-10-CM

## 2022-03-31 DIAGNOSIS — J01.90 ACUTE BACTERIAL SINUSITIS: Primary | ICD-10-CM

## 2022-03-31 DIAGNOSIS — B96.89 ACUTE BACTERIAL SINUSITIS: Primary | ICD-10-CM

## 2022-03-31 DIAGNOSIS — R05.9 COUGH: ICD-10-CM

## 2022-03-31 PROCEDURE — 1036F TOBACCO NON-USER: CPT | Performed by: NURSE PRACTITIONER

## 2022-03-31 PROCEDURE — G8417 CALC BMI ABV UP PARAM F/U: HCPCS | Performed by: NURSE PRACTITIONER

## 2022-03-31 PROCEDURE — 3017F COLORECTAL CA SCREEN DOC REV: CPT | Performed by: NURSE PRACTITIONER

## 2022-03-31 PROCEDURE — G8427 DOCREV CUR MEDS BY ELIG CLIN: HCPCS | Performed by: NURSE PRACTITIONER

## 2022-03-31 PROCEDURE — G8484 FLU IMMUNIZE NO ADMIN: HCPCS | Performed by: NURSE PRACTITIONER

## 2022-03-31 PROCEDURE — 99213 OFFICE O/P EST LOW 20 MIN: CPT | Performed by: NURSE PRACTITIONER

## 2022-03-31 RX ORDER — CEFDINIR 300 MG/1
300 CAPSULE ORAL 2 TIMES DAILY
Qty: 20 CAPSULE | Refills: 0 | Status: SHIPPED | OUTPATIENT
Start: 2022-03-31 | End: 2022-04-10

## 2022-03-31 SDOH — ECONOMIC STABILITY: FOOD INSECURITY: WITHIN THE PAST 12 MONTHS, YOU WORRIED THAT YOUR FOOD WOULD RUN OUT BEFORE YOU GOT MONEY TO BUY MORE.: NEVER TRUE

## 2022-03-31 SDOH — ECONOMIC STABILITY: FOOD INSECURITY: WITHIN THE PAST 12 MONTHS, THE FOOD YOU BOUGHT JUST DIDN'T LAST AND YOU DIDN'T HAVE MONEY TO GET MORE.: NEVER TRUE

## 2022-03-31 ASSESSMENT — ENCOUNTER SYMPTOMS
EYE REDNESS: 0
ABDOMINAL PAIN: 0
PHOTOPHOBIA: 0
CHEST TIGHTNESS: 0
TROUBLE SWALLOWING: 0
WHEEZING: 0
RHINORRHEA: 1
COUGH: 1
CHOKING: 0
VOMITING: 0
EYE PAIN: 0
VOICE CHANGE: 0
SINUS PAIN: 1
SHORTNESS OF BREATH: 0
EYE ITCHING: 0
COLOR CHANGE: 0
BACK PAIN: 0
BLOOD IN STOOL: 0
NAUSEA: 0
SINUS PRESSURE: 1
STRIDOR: 0
EYE DISCHARGE: 0
CONSTIPATION: 0
DIARRHEA: 0
SORE THROAT: 1

## 2022-03-31 ASSESSMENT — PATIENT HEALTH QUESTIONNAIRE - PHQ9
10. IF YOU CHECKED OFF ANY PROBLEMS, HOW DIFFICULT HAVE THESE PROBLEMS MADE IT FOR YOU TO DO YOUR WORK, TAKE CARE OF THINGS AT HOME, OR GET ALONG WITH OTHER PEOPLE: 0
7. TROUBLE CONCENTRATING ON THINGS, SUCH AS READING THE NEWSPAPER OR WATCHING TELEVISION: 0
9. THOUGHTS THAT YOU WOULD BE BETTER OFF DEAD, OR OF HURTING YOURSELF: 0
SUM OF ALL RESPONSES TO PHQ QUESTIONS 1-9: 0
4. FEELING TIRED OR HAVING LITTLE ENERGY: 0
SUM OF ALL RESPONSES TO PHQ QUESTIONS 1-9: 0
2. FEELING DOWN, DEPRESSED OR HOPELESS: 0
6. FEELING BAD ABOUT YOURSELF - OR THAT YOU ARE A FAILURE OR HAVE LET YOURSELF OR YOUR FAMILY DOWN: 0
SUM OF ALL RESPONSES TO PHQ9 QUESTIONS 1 & 2: 0
1. LITTLE INTEREST OR PLEASURE IN DOING THINGS: 0
SUM OF ALL RESPONSES TO PHQ QUESTIONS 1-9: 0
8. MOVING OR SPEAKING SO SLOWLY THAT OTHER PEOPLE COULD HAVE NOTICED. OR THE OPPOSITE, BEING SO FIGETY OR RESTLESS THAT YOU HAVE BEEN MOVING AROUND A LOT MORE THAN USUAL: 0
SUM OF ALL RESPONSES TO PHQ QUESTIONS 1-9: 0
3. TROUBLE FALLING OR STAYING ASLEEP: 0
5. POOR APPETITE OR OVEREATING: 0

## 2022-03-31 ASSESSMENT — SOCIAL DETERMINANTS OF HEALTH (SDOH): HOW HARD IS IT FOR YOU TO PAY FOR THE VERY BASICS LIKE FOOD, HOUSING, MEDICAL CARE, AND HEATING?: NOT VERY HARD

## 2022-03-31 NOTE — PROGRESS NOTES
3/31/2022    TELEHEALTH EVALUATION -- Audio/Visual (During XFQHB-80 public health emergency)    HPI:    Gilbert Gilliam (:  1960) has requested an audio/video evaluation for the following concern(s):    HPI    Sore throat: Patient complains of sore throat, cough at night, right ear pain, nasal congestion, fever and chills, and sweats. She has had a cold for over a week. She has tried airborne, getting worse, she is coughing up yellow phlegm, She is not sleeping due to cough. Productive cough on VV. Denies wheezing. Lore ear drainage. Her  has been sick. Denies body aches. She is having spinal epidural on . She needs to be healthy by then. Review of Systems   Constitutional: Positive for activity change, chills, fatigue and fever. Negative for appetite change, diaphoresis and unexpected weight change. HENT: Positive for congestion, ear pain, postnasal drip, rhinorrhea, sinus pressure, sinus pain and sore throat. Negative for ear discharge, hearing loss, nosebleeds, sneezing, tinnitus, trouble swallowing and voice change. Eyes: Negative for photophobia, pain, discharge, redness and itching. Respiratory: Positive for cough. Negative for choking, chest tightness, shortness of breath, wheezing and stridor. Cardiovascular: Negative for chest pain, palpitations and leg swelling. Gastrointestinal: Negative for abdominal pain, blood in stool, constipation, diarrhea, nausea and vomiting. Endocrine: Negative for cold intolerance, heat intolerance, polydipsia and polyuria. Genitourinary: Negative for difficulty urinating, dysuria, enuresis, flank pain, frequency, hematuria and urgency. Musculoskeletal: Negative for back pain, gait problem, joint swelling, neck pain and neck stiffness. Skin: Negative for color change, pallor, rash and wound. Allergic/Immunologic: Negative for environmental allergies and food allergies.    Neurological: Negative for dizziness, tremors, syncope, speech difficulty, weakness, light-headedness, numbness and headaches. Hematological: Negative for adenopathy. Does not bruise/bleed easily. Psychiatric/Behavioral: Negative for agitation, behavioral problems, confusion, decreased concentration, dysphoric mood, hallucinations, self-injury, sleep disturbance and suicidal ideas. The patient is not nervous/anxious and is not hyperactive. Prior to Visit Medications    Medication Sig Taking?  Authorizing Provider   cefdinir (OMNICEF) 300 MG capsule Take 1 capsule by mouth 2 times daily for 10 days Yes Milagro Amin, APRN - CNP   metoprolol tartrate (LOPRESSOR) 25 MG tablet TAKE 1 TABLET BY MOUTH TWICE DAILY Yes Merle Menon MD   SYNTHROID 112 MCG tablet TAKE 1 TABLET BY MOUTH DAILY Yes Merle Menon MD   SYNTHROID 125 MCG tablet TAKE 1 TABLET BY MOUTH DAILY Yes Merle Menon MD   hydroCHLOROthiazide (HYDRODIURIL) 50 MG tablet TAKE 1 TABLET BY MOUTH DAILY Yes Merle Menon MD   dilTIAZem (CARDIZEM CD) 360 MG extended release capsule TAKE 1 CAPSULE BY MOUTH DAILY Yes Merle Menon MD   ondansetron (ZOFRAN ODT) 8 MG TBDP disintegrating tablet Place 1 tablet under the tongue every 8 hours as needed for Nausea or Vomiting Yes Merle Menon MD   methocarbamol (ROBAXIN) 750 MG tablet TAKE 1 TABLET BY MOUTH EVERY 6 HOURS AS NEEDED FOR PAIN Yes Historical Provider, MD   REXULTI 1 MG TABS tablet TK 1/2 TO 1 T PO EVERY MORNING Yes Historical Provider, MD   L-methylfolate Calcium 15 MG TABS TK 1 T PO QAM Yes Historical Provider, MD   DULoxetine (CYMBALTA) 60 MG extended release capsule Take 120 mg by mouth daily Yes Historical Provider, MD   melatonin 3 MG TABS tablet Take 3 mg by mouth Yes Historical Provider, MD   metFORMIN (GLUCOPHAGE-XR) 500 MG extended release tablet Take 2,000 mg by mouth Yes Historical Provider, MD   buPROPion (WELLBUTRIN XL) 300 MG extended release tablet Take 300 mg by mouth Yes Historical Provider, MD Hallucinations        [] Abnormal-     Other pertinent observable physical exam findings-     Pt can speak in full sentences, not SOB, not wheezing, wet productive cough    Due to this being a TeleHealth encounter, evaluation of the following organ systems is limited: Vitals/Constitutional/EENT/Resp/CV/GI//MS/Neuro/Skin/Heme-Lymph-Imm. ASSESSMENT/PLAN:  1. Acute bacterial sinusitis    - cefdinir (OMNICEF) 300 MG capsule; Take 1 capsule by mouth 2 times daily for 10 days  Dispense: 20 capsule; Refill: 0    2. Right ear pain    - cefdinir (OMNICEF) 300 MG capsule; Take 1 capsule by mouth 2 times daily for 10 days  Dispense: 20 capsule; Refill: 0    3. Cough    - cefdinir (OMNICEF) 300 MG capsule; Take 1 capsule by mouth 2 times daily for 10 days  Dispense: 20 capsule; Refill: 0    Recommended saltwater gargles, warm fluids with honey and a humidifier at night. Flonase, Zyrtec, NSAIDS as needed. Follow up if symptoms worsen or do not improve. Return if symptoms worsen or fail to improve. An  electronic signature was used to authenticate this note. --KETIH Jones - CNP on 3/31/2022 at 3:28 PM          This document was prepared by a combination of typing and transcription through a voice recognition software. Wilda Main, was evaluated through a synchronous (real-time) audio-video encounter. The patient (or guardian if applicable) is aware that this is a billable service, which includes applicable co-pays. This Virtual Visit was conducted with patient's (and/or legal guardian's) consent. The visit was conducted pursuant to the emergency declaration under the [facility], [address] [place] [address] authority and the [employer] Act. Patient identification was verified, and a caregiver was present when appropriate. The patient was located in a state where the provider was licensed to provide care.  Services were provided through a video synchronous discussion virtually to substitute for in-person clinic visit.

## 2022-04-04 ENCOUNTER — TELEPHONE (OUTPATIENT)
Dept: FAMILY MEDICINE CLINIC | Age: 62
End: 2022-04-04

## 2022-04-04 NOTE — TELEPHONE ENCOUNTER
Please contact pt's  regarding whether or not she had a Colonoscopy around 2019. He said it wasn't with [facility] but I'm unable to see outside of [facility].  concerned due to [facility] requesting her to have another one. Not sure if she's actually due for one or if the results were never sent to her PCP. Please call and advise.

## 2022-04-06 NOTE — TELEPHONE ENCOUNTER
Pt rc but does not know where she had her Colonoscopy completed. Neither does her .  stated they go to different places but not sure where pt went. Advised pt to call back when she finds out.

## 2022-04-06 NOTE — TELEPHONE ENCOUNTER
Left message for patient to call back, does patient know the name of the physician who performed the colonoscopy?

## 2022-04-12 ENCOUNTER — OFFICE VISIT (OUTPATIENT)
Dept: FAMILY MEDICINE CLINIC | Age: 62
End: 2022-04-12
Payer: COMMERCIAL

## 2022-04-12 VITALS
HEART RATE: 64 BPM | HEIGHT: 63 IN | BODY MASS INDEX: 35.61 KG/M2 | SYSTOLIC BLOOD PRESSURE: 118 MMHG | DIASTOLIC BLOOD PRESSURE: 84 MMHG | OXYGEN SATURATION: 97 % | WEIGHT: 201 LBS

## 2022-04-12 DIAGNOSIS — R73.03 PREDIABETES: ICD-10-CM

## 2022-04-12 DIAGNOSIS — Z00.00 WELL ADULT EXAM: Primary | ICD-10-CM

## 2022-04-12 DIAGNOSIS — E03.9 HYPOTHYROIDISM, UNSPECIFIED TYPE: ICD-10-CM

## 2022-04-12 DIAGNOSIS — I10 PRIMARY HYPERTENSION: ICD-10-CM

## 2022-04-12 PROCEDURE — 99396 PREV VISIT EST AGE 40-64: CPT | Performed by: FAMILY MEDICINE

## 2022-04-12 RX ORDER — CHOLESTYRAMINE 4 G/9G
1 POWDER, FOR SUSPENSION ORAL DAILY
Qty: 30 PACKET | Refills: 2 | Status: SHIPPED | OUTPATIENT
Start: 2022-04-12

## 2022-04-12 RX ORDER — PANTOPRAZOLE SODIUM 40 MG/1
40 TABLET, DELAYED RELEASE ORAL
Qty: 30 TABLET | Refills: 5 | Status: SHIPPED | OUTPATIENT
Start: 2022-04-12

## 2022-04-12 RX ORDER — ONDANSETRON 8 MG/1
8 TABLET, ORALLY DISINTEGRATING ORAL EVERY 8 HOURS PRN
Qty: 30 TABLET | Refills: 2 | Status: SHIPPED | OUTPATIENT
Start: 2022-04-12

## 2022-04-12 NOTE — PROGRESS NOTES
Jimbo Saleh (:  1960) is a 57 y.o. female,Established patient, here for evaluation of the following chief complaint(s): Annual Exam and Referral - General (GI - for colonoscopy, complains of persistent nausea, and diarrhea since her gallbladder surgery.)         ASSESSMENT/PLAN:  There are no diagnoses linked to this encounter. No follow-ups on file. Subjective   SUBJECTIVE/OBJECTIVE:  HPI   Pt is a of 57 y.o. female comes in today with   Chief Complaint   Patient presents with    Annual Exam    Referral - General     GI - for colonoscopy, complains of persistent nausea, and diarrhea since her gallbladder surgery. Nausea and diarrhea. Got a little better but did not completely go away. No gerd. No dysphagia. Vitals:    22 1312   BP: 118/84   Site: Left Upper Arm   Position: Sitting   Cuff Size: Medium Adult   Pulse: 64   SpO2: 97%   Weight: 201 lb (91.2 kg)   Height: 5' 3\" (1.6 m)      Past Medical History:Reviewed  Medications:Reviewed. Allergies   Allergen Reactions    Ciprofloxacin     Sulfa Antibiotics       Social hx:Reviewed. Social History     Tobacco Use   Smoking Status Never Smoker   Smokeless Tobacco Never Used       Review of Systems       Objective   Physical Exam         An electronic signature was used to authenticate this note.     --Pam Flores MD

## 2022-04-12 NOTE — PROGRESS NOTES
Jimbo Saleh (:  1960) is a 57 y.o. female,Established patient, here for evaluation of the following chief complaint(s): Annual Exam and Referral - General (GI - for colonoscopy, complains of persistent nausea, and diarrhea since her gallbladder surgery.)         ASSESSMENT/PLAN:  Juan Costello was seen today for annual exam and referral - general.    Diagnoses and all orders for this visit:    Well adult exam  -     TSH; Future  -     Comprehensive Metabolic Panel; Future  -     Hemoglobin A1C; Future  -     Lipid Panel; Future  Reviewed diet and exercise  Hypothyroidism, unspecified type  -     TSH; Future  Stable on levo  Primary hypertension  -     Comprehensive Metabolic Panel; Future  -     Lipid Panel; Future  The current medical regimen is effective;  continue present plan and medications. Prediabetes  -     Hemoglobin A1C; Future  Recheck to see if stable with diet  Other orders  GI symptoms not well controlled  Trial of cholestyramine for diarrhea s/p choly and PPI for gerd  Call or return to clinic prn if these symptoms worsen or fail to improve as anticipated. -     cholestyramine (QUESTRAN) 4 g packet; Take 1 packet by mouth daily  -     pantoprazole (PROTONIX) 40 MG tablet; Take 1 tablet by mouth every morning (before breakfast)  -     ondansetron (ZOFRAN ODT) 8 MG TBDP disintegrating tablet; Place 1 tablet under the tongue every 8 hours as needed for Nausea or Vomiting         No follow-ups on file. Subjective   SUBJECTIVE/OBJECTIVE:  NAIF   Pt is a of 57 y.o. female comes in today with   Chief Complaint   Patient presents with    Annual Exam    Referral - General     GI - for colonoscopy, complains of persistent nausea, and diarrhea since her gallbladder surgery.    here for annual   Vitals:    22 1312   BP: 118/84   Site: Left Upper Arm   Position: Sitting   Cuff Size: Medium Adult   Pulse: 64   SpO2: 97%   Weight: 201 lb (91.2 kg)   Height: 5' 3\" (1.6 m)        Past Medical History:Reviewed  Medications:Reviewed. Allergies   Allergen Reactions    Ciprofloxacin     Sulfa Antibiotics       Social hx:Reviewed. Social History     Tobacco Use   Smoking Status Never Smoker   Smokeless Tobacco Never Used       Review of Systems   Constitutional: Negative. Respiratory: Negative. Cardiovascular: Negative. Objective   Physical Exam  Constitutional:       General: She is not in acute distress. Appearance: She is well-developed. She is not diaphoretic. HENT:      Head: Normocephalic and atraumatic. Eyes:      General: No scleral icterus. Neck:      Thyroid: No thyroid mass or thyromegaly. Trachea: No tracheal deviation. Cardiovascular:      Rate and Rhythm: Normal rate and regular rhythm. Heart sounds: Normal heart sounds. No murmur heard. Pulmonary:      Effort: Pulmonary effort is normal.      Breath sounds: Normal breath sounds. Musculoskeletal:      Cervical back: Normal range of motion and neck supple. Lymphadenopathy:      Head:      Right side of head: No submandibular adenopathy. Left side of head: No submandibular adenopathy. Cervical: No cervical adenopathy. Skin:     General: Skin is warm and dry. Findings: No rash. Neurological:      Mental Status: She is alert and oriented to person, place, and time. Cranial Nerves: No cranial nerve deficit. Psychiatric:         Behavior: Behavior normal.         Thought Content: Thought content normal.         Judgment: Judgment normal.              An electronic signature was used to authenticate this note.     --Laura Roque MD

## 2022-04-14 DIAGNOSIS — I10 PRIMARY HYPERTENSION: ICD-10-CM

## 2022-04-14 DIAGNOSIS — E03.9 HYPOTHYROIDISM, UNSPECIFIED TYPE: ICD-10-CM

## 2022-04-14 DIAGNOSIS — R73.03 PREDIABETES: ICD-10-CM

## 2022-04-14 DIAGNOSIS — Z00.00 WELL ADULT EXAM: ICD-10-CM

## 2022-04-14 LAB
A/G RATIO: 2.1 (ref 1.1–2.2)
ALBUMIN SERPL-MCNC: 4.6 G/DL (ref 3.4–5)
ALP BLD-CCNC: 101 U/L (ref 40–129)
ALT SERPL-CCNC: 15 U/L (ref 10–40)
ANION GAP SERPL CALCULATED.3IONS-SCNC: 13 MMOL/L (ref 3–16)
AST SERPL-CCNC: 8 U/L (ref 15–37)
BILIRUB SERPL-MCNC: <0.2 MG/DL (ref 0–1)
BUN BLDV-MCNC: 22 MG/DL (ref 7–20)
CALCIUM SERPL-MCNC: 9.9 MG/DL (ref 8.3–10.6)
CHLORIDE BLD-SCNC: 103 MMOL/L (ref 99–110)
CHOLESTEROL, TOTAL: 201 MG/DL (ref 0–199)
CO2: 24 MMOL/L (ref 21–32)
CREAT SERPL-MCNC: 0.8 MG/DL (ref 0.6–1.2)
GFR AFRICAN AMERICAN: >60
GFR NON-AFRICAN AMERICAN: >60
GLUCOSE BLD-MCNC: 100 MG/DL (ref 70–99)
HDLC SERPL-MCNC: 71 MG/DL (ref 40–60)
LDL CHOLESTEROL CALCULATED: 116 MG/DL
POTASSIUM SERPL-SCNC: 4.3 MMOL/L (ref 3.5–5.1)
SODIUM BLD-SCNC: 140 MMOL/L (ref 136–145)
TOTAL PROTEIN: 6.8 G/DL (ref 6.4–8.2)
TRIGL SERPL-MCNC: 72 MG/DL (ref 0–150)
TSH SERPL DL<=0.05 MIU/L-ACNC: 1.23 UIU/ML (ref 0.27–4.2)
VLDLC SERPL CALC-MCNC: 14 MG/DL

## 2022-04-15 ENCOUNTER — TELEPHONE (OUTPATIENT)
Dept: FAMILY MEDICINE CLINIC | Age: 62
End: 2022-04-15

## 2022-04-15 LAB
ESTIMATED AVERAGE GLUCOSE: 108.3 MG/DL
HBA1C MFR BLD: 5.4 %

## 2022-04-15 NOTE — TELEPHONE ENCOUNTER
Patient would like to have  call her regarding her test results. She saw where her AST is very low and is concerned. Please advise patient.

## 2022-04-17 ASSESSMENT — ENCOUNTER SYMPTOMS: RESPIRATORY NEGATIVE: 1

## 2022-04-18 NOTE — TELEPHONE ENCOUNTER
Patient informed of results, assured her that if Dr. Adis Nina was concerned about the level he would have included it in his message. Requesting a call back once provider is in the office to let her know for sure if she should be concerned that it is \"so low,\" and if she should do anything.

## 2022-04-21 NOTE — TELEPHONE ENCOUNTER
AST is a liver enzyme; some leaks from the liver into the blood.   Low doesn't mean anything; lab just has to put a range on everything so it's set at the lower average number  Only high is clinically meaningful

## 2022-04-29 RX ORDER — DILTIAZEM HYDROCHLORIDE 360 MG/1
360 CAPSULE, EXTENDED RELEASE ORAL DAILY
Qty: 90 CAPSULE | Refills: 1 | Status: SHIPPED | OUTPATIENT
Start: 2022-04-29 | End: 2022-10-24

## 2022-07-05 RX ORDER — HYDROCHLOROTHIAZIDE 50 MG/1
50 TABLET ORAL DAILY
Qty: 90 TABLET | Refills: 1 | Status: SHIPPED | OUTPATIENT
Start: 2022-07-05

## 2022-07-05 NOTE — TELEPHONE ENCOUNTER
Medication:   Requested Prescriptions     Pending Prescriptions Disp Refills    hydroCHLOROthiazide (HYDRODIURIL) 50 MG tablet [Pharmacy Med Name: HYDROCHLOROTHIAZIDE 50MG TABLETS] 90 tablet 1     Sig: TAKE 1 TABLET BY MOUTH DAILY        Last Filled:  11/29/2021    Patient Phone Number: [phone] (home)     Last appt: 4/12/2022   Next appt: Visit date not found    Last OARRS: No flowsheet data found.

## 2022-07-28 NOTE — TELEPHONE ENCOUNTER
Medication:   Requested Prescriptions     Pending Prescriptions Disp Refills    metoprolol tartrate (LOPRESSOR) 25 MG tablet [Pharmacy Med Name: METOPROLOL TARTRATE 25MG TABLETS] 180 tablet 1     Sig: TAKE 1 TABLET BY MOUTH TWICE DAILY        Last Filled:  01/27/2022    Patient Phone Number: [phone] (home)     Last appt: 4/12/2022   Next appt: Visit date not found    Last OARRS: No flowsheet data found.

## 2022-08-03 NOTE — TELEPHONE ENCOUNTER
Medication:   Requested Prescriptions     Pending Prescriptions Disp Refills    SYNTHROID 112 MCG tablet [Pharmacy Med Name: Eleonora Tello 0.112MG (112MCG) TABLETS] 90 tablet 1     Sig: TAKE 1 TABLET BY MOUTH DAILY        Last Filled:  01/27/2022    Patient Phone Number: [phone] (home)     Last appt: 4/12/2022   Next appt: Visit date not found    Last OARRS: No flowsheet data found.

## 2022-08-04 RX ORDER — LEVOTHYROXINE SODIUM 112 MCG
112 TABLET ORAL DAILY
Qty: 90 TABLET | Refills: 1 | Status: SHIPPED | OUTPATIENT
Start: 2022-08-04

## 2022-10-24 RX ORDER — DILTIAZEM HYDROCHLORIDE 360 MG/1
360 CAPSULE, EXTENDED RELEASE ORAL DAILY
Qty: 90 CAPSULE | Refills: 1 | Status: SHIPPED | OUTPATIENT
Start: 2022-10-24

## 2022-10-24 NOTE — TELEPHONE ENCOUNTER
Medication:   Requested Prescriptions     Pending Prescriptions Disp Refills    dilTIAZem (CARDIZEM CD) 360 MG extended release capsule [Pharmacy Med Name: DILTIAZEM ER 360MG (24 HR/CD) CAPS] 90 capsule 1     Sig: TAKE 1 CAPSULE BY MOUTH DAILY        Last Filled: 4/29/2022   Last appt: 4/12/2022   Next appt: none

## 2022-11-28 ENCOUNTER — COMMUNITY OUTREACH (OUTPATIENT)
Dept: FAMILY MEDICINE CLINIC | Age: 62
End: 2022-11-28

## 2023-01-03 RX ORDER — HYDROCHLOROTHIAZIDE 50 MG/1
50 TABLET ORAL DAILY
Qty: 90 TABLET | Refills: 1 | Status: SHIPPED | OUTPATIENT
Start: 2023-01-03

## 2023-01-03 NOTE — TELEPHONE ENCOUNTER
Medication:   Requested Prescriptions     Pending Prescriptions Disp Refills    hydroCHLOROthiazide (HYDRODIURIL) 50 MG tablet [Pharmacy Med Name: HYDROCHLOROTHIAZIDE 50MG TABLETS] 90 tablet 1     Sig: TAKE 1 TABLET BY MOUTH DAILY     Last Filled:  7/5/2022    Last appt: 4/12/2022   Next appt: Visit date not found    Last OARRS: No flowsheet data found.

## 2023-01-17 RX ORDER — LEVOTHYROXINE SODIUM 112 MCG
112 TABLET ORAL DAILY
Qty: 90 TABLET | Refills: 0 | Status: SHIPPED | OUTPATIENT
Start: 2023-01-17

## 2023-01-17 RX ORDER — LEVOTHYROXINE SODIUM 112 MCG
112 TABLET ORAL DAILY
Qty: 90 TABLET | Refills: 1 | Status: SHIPPED | OUTPATIENT
Start: 2023-01-17

## 2023-01-17 NOTE — TELEPHONE ENCOUNTER
Medication:   Requested Prescriptions     Pending Prescriptions Disp Refills    metoprolol tartrate (LOPRESSOR) 25 MG tablet [Pharmacy Med Name: METOPROLOL TARTRATE 25MG TABLETS] 180 tablet 1     Sig: TAKE 1 TABLET BY MOUTH TWICE DAILY    SYNTHROID 112 MCG tablet [Pharmacy Med Name: SYNTHROID 0.112MG (112MCG) TABLETS] 90 tablet 1     Sig: TAKE 1 TABLET BY MOUTH DAILY        Last Filled: Metoprolol (7/29/22) / Synthroid (8/4/22)   Last appt: 4/12/2022   Next appt: none

## 2023-01-17 NOTE — TELEPHONE ENCOUNTER
Patient will next an appt in April       Medication:   Requested Prescriptions     Pending Prescriptions Disp Refills    SYNTHROID 112 MCG tablet [Pharmacy Med Name: Molina Goncalves 0.112MG (112MCG) TABLETS] 90 tablet 1     Sig: TAKE 1 TABLET BY MOUTH DAILY        Last Filled:  8/4/22    Patient Phone Number: [phone] (home)     Last appt: 4/12/2022   Next appt: Visit date not found    Last OARRS: No flowsheet data found.

## 2023-01-18 ENCOUNTER — TELEPHONE (OUTPATIENT)
Dept: FAMILY MEDICINE CLINIC | Age: 63
End: 2023-01-18

## 2023-01-18 NOTE — TELEPHONE ENCOUNTER
Medication  SYNTHROID 112 MCG tablet [[id number]]  SYNTHROID 112 MCG tablet [[id number]]     Order Details  Dose: 112 mcg Route: Oral Frequency: DAILY   Dispense Quantity: 90 tablet Refills: 1          Sig: TAKE 1 TABLET BY MOUTH DAILY         Start Date: 01/17/23 End Date: --   Written Date: 01/17/23 Expiration Date: 01/17/24   Original Order:  SYNTHROID 112 MCG tablet [[id number]]   Providers  Authorizing Provider: Angeli Lopez MD NPI: [id number]   Ordering User:  Angeli Lopez MD        Pharmacy  [facility] #[id number] - [id number], [address] [place] [address] [phone] Josiah Gilford [phone]   [facility] [address]   Phone:  [phone]  Fax:  [phone]   Diagnoses   Codes Comments   Hypothyroidism, unspecified type  E03.9

## 2023-01-23 ENCOUNTER — HOSPITAL ENCOUNTER (OUTPATIENT)
Dept: GENERAL RADIOLOGY | Age: 63
Discharge: HOME | End: 2023-01-23
Payer: COMMERCIAL

## 2023-01-23 ENCOUNTER — OFFICE VISIT (OUTPATIENT)
Dept: FAMILY MEDICINE CLINIC | Age: 63
End: 2023-01-23
Payer: COMMERCIAL

## 2023-01-23 VITALS
SYSTOLIC BLOOD PRESSURE: 120 MMHG | OXYGEN SATURATION: 97 % | HEIGHT: 63 IN | DIASTOLIC BLOOD PRESSURE: 84 MMHG | HEART RATE: 62 BPM | BODY MASS INDEX: 38.09 KG/M2 | WEIGHT: 215 LBS

## 2023-01-23 DIAGNOSIS — M79.672 LEFT FOOT PAIN: ICD-10-CM

## 2023-01-23 DIAGNOSIS — M79.672 LEFT FOOT PAIN: Primary | ICD-10-CM

## 2023-01-23 DIAGNOSIS — R73.03 PREDIABETES: ICD-10-CM

## 2023-01-23 DIAGNOSIS — I10 PRIMARY HYPERTENSION: ICD-10-CM

## 2023-01-23 DIAGNOSIS — D50.9 IRON DEFICIENCY ANEMIA, UNSPECIFIED IRON DEFICIENCY ANEMIA TYPE: ICD-10-CM

## 2023-01-23 DIAGNOSIS — E03.9 HYPOTHYROIDISM, UNSPECIFIED TYPE: ICD-10-CM

## 2023-01-23 PROCEDURE — 3074F SYST BP LT 130 MM HG: CPT | Performed by: FAMILY MEDICINE

## 2023-01-23 PROCEDURE — 3079F DIAST BP 80-89 MM HG: CPT | Performed by: FAMILY MEDICINE

## 2023-01-23 PROCEDURE — 99214 OFFICE O/P EST MOD 30 MIN: CPT | Performed by: FAMILY MEDICINE

## 2023-01-23 PROCEDURE — 73630 X-RAY EXAM OF FOOT: CPT

## 2023-01-23 RX ORDER — SEMAGLUTIDE 0.5 MG/.5ML
0.5 INJECTION, SOLUTION SUBCUTANEOUS
Qty: 2 ML | Refills: 0 | Status: SHIPPED | OUTPATIENT
Start: 2023-01-23 | End: 2023-02-20

## 2023-01-23 RX ORDER — BREXPIPRAZOLE 2 MG/1
2 TABLET ORAL DAILY
COMMUNITY
Start: 2023-01-15

## 2023-01-23 RX ORDER — MELOXICAM 15 MG/1
15 TABLET ORAL DAILY
Qty: 15 TABLET | Refills: 0 | Status: SHIPPED | OUTPATIENT
Start: 2023-01-23

## 2023-01-23 RX ORDER — SEMAGLUTIDE 2.4 MG/.75ML
2.4 INJECTION, SOLUTION SUBCUTANEOUS
Qty: 3 ML | Refills: 2 | Status: SHIPPED | OUTPATIENT
Start: 2023-01-23

## 2023-01-23 RX ORDER — SEMAGLUTIDE 1.7 MG/.75ML
1.7 INJECTION, SOLUTION SUBCUTANEOUS
Qty: 3 ML | Refills: 0 | Status: SHIPPED | OUTPATIENT
Start: 2023-01-23 | End: 2023-02-20

## 2023-01-23 RX ORDER — SEMAGLUTIDE 1 MG/.5ML
1 INJECTION, SOLUTION SUBCUTANEOUS
Qty: 2 ML | Refills: 0 | Status: SHIPPED | OUTPATIENT
Start: 2023-01-23 | End: 2023-02-20

## 2023-01-23 RX ORDER — SEMAGLUTIDE 0.25 MG/.5ML
0.25 INJECTION, SOLUTION SUBCUTANEOUS
Qty: 2 ML | Refills: 0 | Status: SHIPPED | OUTPATIENT
Start: 2023-01-23 | End: 2023-02-20

## 2023-01-23 ASSESSMENT — PATIENT HEALTH QUESTIONNAIRE - PHQ9
SUM OF ALL RESPONSES TO PHQ QUESTIONS 1-9: 0
SUM OF ALL RESPONSES TO PHQ QUESTIONS 1-9: 0
2. FEELING DOWN, DEPRESSED OR HOPELESS: 0
SUM OF ALL RESPONSES TO PHQ QUESTIONS 1-9: 0
SUM OF ALL RESPONSES TO PHQ9 QUESTIONS 1 & 2: 0
1. LITTLE INTEREST OR PLEASURE IN DOING THINGS: 0
SUM OF ALL RESPONSES TO PHQ QUESTIONS 1-9: 0

## 2023-01-23 NOTE — PROGRESS NOTES
Nora Barajas (:  1960) is a 60 y.o. female,Established patient, here for evaluation of the following chief complaint(s): Foot Pain (L foot pain x 3 weeks, denies injury, becoming worse.)         ASSESSMENT/PLAN:  Joselyn Malcolm was seen today for foot pain. Diagnoses and all orders for this visit:    Left foot pain  -     XR FOOT LEFT (MIN 3 VIEWS); Future  -     AFL - Elner Matter, DPM, Podiatry, [place]  If xrays negative will wear supportive shoes with arch support  2 weeks of mobic. Avoid other nsaids  If persists refer to podiatry   Primary hypertension  -     Comprehensive Metabolic Panel; Future  -     Lipid Panel; Future  -     CBC; Future  Stable on current meds  Hypothyroidism, unspecified type  -     TSH; Future  Stable on levo  Prediabetes  -     Hemoglobin A1C; Future  Has been stable with diet  Iron deficiency anemia, unspecified iron deficiency anemia type  -     Ferritin; Future  -     CBC; Future  blood work to see if stable. Does not tolerate po iron well. H/o infusions in the past  BMI 38.0-38.9,adult  Not well controlled. Trial of wegovy  Medication side affects and adverse reactions reviewed. Other orders  -     Semaglutide-Weight Management (WEGOVY) 0.25 MG/0.5ML SOAJ SC injection; Inject 0.25 mg into the skin every 7 days for 28 days  -     Semaglutide-Weight Management (WEGOVY) 0.5 MG/0.5ML SOAJ SC injection; Inject 0.5 mg into the skin every 7 days for 28 days  -     Semaglutide-Weight Management (WEGOVY) 1 MG/0.5ML SOAJ SC injection; Inject 1 mg into the skin every 7 days for 28 days  -     Semaglutide-Weight Management (WEGOVY) 1.7 MG/0.75ML SOAJ SC injection; Inject 1.7 mg into the skin every 7 days for 28 days  -     Semaglutide-Weight Management (WEGOVY) 2.4 MG/0.75ML SOAJ SC injection; Inject 2.4 mg into the skin every 7 days  -     meloxicam (MOBIC) 15 MG tablet; Take 1 tablet by mouth daily       No follow-ups on file.          Subjective SUBJECTIVE/OBJECTIVE:  HPI  Pt is a of 60 y.o. female comes in today with   Chief Complaint   Patient presents with    Foot Pain     L foot pain x 3 weeks, denies injury, becoming worse. Has had iron infusions in the past.   Feeling more tired. Taking thyroid med regularly. Struggling with diet. Left foot hurting for 3 weeks  Burning. No injury. Worse with foot flexion  No redness or swelling    Vitals:    01/23/23 1102   BP: 120/84   Site: Right Upper Arm   Position: Sitting   Cuff Size: Large Adult   Pulse: 62   SpO2: 97%   Weight: 215 lb (97.5 kg)   Height: 5' 3\" (1.6 m)      Past Medical History:Reviewed  Medications:Reviewed. Allergies   Allergen Reactions    Ciprofloxacin     Sulfa Antibiotics       Social hx:Reviewed. Social History     Tobacco Use   Smoking Status Never   Smokeless Tobacco Never       Review of Systems       Objective   Physical Exam     NAD  Medial midfoot tenderness    An electronic signature was used to authenticate this note.     --Radha Gaspar MD

## 2023-01-24 ENCOUNTER — TELEPHONE (OUTPATIENT)
Dept: FAMILY MEDICINE CLINIC | Age: 63
End: 2023-01-24

## 2023-01-24 NOTE — TELEPHONE ENCOUNTER
Medication  Semaglutide-Weight Management (WEGOVY) 0.25 MG/0.5ML SOAJ SC injection [[id number]]  Semaglutide-Weight Management (WEGOVY) 0.25 MG/0.5ML SOAJ SC injection [[id number]]     Order Details  Dose: 0.25 mg Route: SubCUTAneous Frequency: EVERY 7 DAYS   Dispense Quantity: 2 mL Refills: 0          Sig: Inject 0.25 mg into the skin every 7 days for 28 days         Start Date: 01/23/23 End Date: 02/20/23 after 4 doses   Written Date: 01/23/23 Expiration Date: 01/23/24   Providers  Authorizing Provider: Eddie Godinez MD NPI: [id number]   Ordering User:  Amauri Kiser #[id number] - [id number], [address] [place] [address] [phone] Torey Kennedy [phone]   [address] [place] [address]   Phone:  [phone]  Fax:  [phone]

## 2023-01-25 ENCOUNTER — TELEPHONE (OUTPATIENT)
Dept: FAMILY MEDICINE CLINIC | Age: 63
End: 2023-01-25

## 2023-01-25 NOTE — TELEPHONE ENCOUNTER
PA requested         Medication  Semaglutide-Weight Management (WEGOVY) 0.25 MG/0.5ML SOAJ SC injection [[id number]]  Semaglutide-Weight Management (WEGOVY) 0.25 MG/0.5ML SOAJ SC injection [[id number]]     Order Details  Dose: 0.25 mg Route: SubCUTAneous Frequency: EVERY 7 DAYS   Dispense Quantity: 2 mL Refills: 0          Sig: Inject 0.25 mg into the skin every 7 days for 28 days         Start Date: 01/23/23 End Date: 02/20/23 after 4 doses   Written Date: 01/23/23 Expiration Date: 01/23/24   Providers    Authorizing Provider: Cj Valdez MD NPI: [id number]   Ordering User:  Nora Danielson 4 #[id number] - [id number], [facility] [address] [phone] Marino Reynoso [phone]   [address] [facility] [address]   Phone:  [phone]  Fax:  [phone]

## 2023-01-25 NOTE — TELEPHONE ENCOUNTER
Submitted PA for [place]  Via CMM  Key: [id number]: APPROVED    Status:Approved; Review Type:Prior Auth; Coverage Start Date:12/31/2022; Coverage End Date:08/28/2023;

## 2023-01-26 RX ORDER — LEVOTHYROXINE SODIUM 112 UG/1
112 TABLET ORAL DAILY
Qty: 90 TABLET | Refills: 1 | Status: SHIPPED | OUTPATIENT
Start: 2023-01-26

## 2023-01-26 NOTE — TELEPHONE ENCOUNTER
Received a DENIAL for Synthroid 112MCG tablets. Letter attached. If this requires a response please respond to the pool ( P [facility] [phone]). Thank you please advise patient.

## 2023-03-02 RX ORDER — ONDANSETRON 8 MG/1
TABLET, ORALLY DISINTEGRATING ORAL
Qty: 30 TABLET | Refills: 2 | Status: SHIPPED | OUTPATIENT
Start: 2023-03-02

## 2023-03-02 NOTE — TELEPHONE ENCOUNTER
Medication:   Requested Prescriptions     Pending Prescriptions Disp Refills    ondansetron (ZOFRAN-ODT) 8 MG TBDP disintegrating tablet [Pharmacy Med Name: ONDANSETRON ODT 8MG TABLETS] 30 tablet 2     Sig: DISSOLVE 1 TABLET UNDER THE TONGUE EVERY 8 HOURS AS NEEDED FOR NAUSEA OR VOMITING        Last Filled: 4/12/2022   Last appt: 1/23/2023   Next appt: none

## 2023-03-16 DIAGNOSIS — I10 PRIMARY HYPERTENSION: ICD-10-CM

## 2023-03-16 DIAGNOSIS — D50.9 IRON DEFICIENCY ANEMIA, UNSPECIFIED IRON DEFICIENCY ANEMIA TYPE: ICD-10-CM

## 2023-03-16 DIAGNOSIS — E03.9 HYPOTHYROIDISM, UNSPECIFIED TYPE: Primary | ICD-10-CM

## 2023-03-16 LAB
ALBUMIN SERPL-MCNC: 4.4 G/DL (ref 3.4–5)
ALBUMIN/GLOB SERPL: 1.8 {RATIO} (ref 1.1–2.2)
ALP SERPL-CCNC: 62 U/L (ref 40–129)
ALT SERPL-CCNC: 19 U/L (ref 10–40)
ANION GAP SERPL CALCULATED.3IONS-SCNC: 18 MMOL/L (ref 3–16)
AST SERPL-CCNC: 13 U/L (ref 15–37)
BILIRUB SERPL-MCNC: 0.3 MG/DL (ref 0–1)
BUN SERPL-MCNC: 15 MG/DL (ref 7–20)
CALCIUM SERPL-MCNC: 10.4 MG/DL (ref 8.3–10.6)
CHLORIDE SERPL-SCNC: 100 MMOL/L (ref 99–110)
CO2 SERPL-SCNC: 25 MMOL/L (ref 21–32)
CREAT SERPL-MCNC: 1 MG/DL (ref 0.6–1.2)
FERRITIN SERPL IA-MCNC: 29.3 NG/ML (ref 15–150)
GFR SERPLBLD CREATININE-BSD FMLA CKD-EPI: >60 ML/MIN/{1.73_M2}
GLUCOSE SERPL-MCNC: 96 MG/DL (ref 70–99)
POTASSIUM SERPL-SCNC: 3.9 MMOL/L (ref 3.5–5.1)
PROT SERPL-MCNC: 6.8 G/DL (ref 6.4–8.2)
SODIUM SERPL-SCNC: 143 MMOL/L (ref 136–145)

## 2023-03-24 RX ORDER — LEVOTHYROXINE SODIUM 0.1 MG/1
100 TABLET ORAL DAILY
Qty: 90 TABLET | Refills: 1 | Status: SHIPPED | OUTPATIENT
Start: 2023-03-24

## 2023-04-25 ENCOUNTER — TELEPHONE (OUTPATIENT)
Dept: FAMILY MEDICINE CLINIC | Age: 63
End: 2023-04-25

## 2023-04-25 NOTE — TELEPHONE ENCOUNTER
Patient is requesting refill on Wegovy, not sure which dose she is on because she is not at home but believes she has reached the highest dose.

## 2023-04-26 RX ORDER — SEMAGLUTIDE 2.4 MG/.75ML
2.4 INJECTION, SOLUTION SUBCUTANEOUS
Qty: 3 ML | Refills: 2 | Status: SHIPPED | OUTPATIENT
Start: 2023-04-26

## 2023-05-08 RX ORDER — DILTIAZEM HYDROCHLORIDE 360 MG/1
360 CAPSULE, EXTENDED RELEASE ORAL DAILY
Qty: 90 CAPSULE | Refills: 1 | Status: SHIPPED | OUTPATIENT
Start: 2023-05-08

## 2023-05-08 NOTE — TELEPHONE ENCOUNTER
Medication:   Requested Prescriptions     Pending Prescriptions Disp Refills    dilTIAZem (CARDIZEM CD) 360 MG extended release capsule [Pharmacy Med Name: DILTIAZEM ER 360MG (24 HR/CD) CAPS] 90 capsule 1     Sig: TAKE 1 CAPSULE BY MOUTH DAILY        Last Filled: 10/24/2022   Last appt: 1/23/2023   Next appt: none

## 2023-05-15 RX ORDER — ONDANSETRON 8 MG/1
TABLET, ORALLY DISINTEGRATING ORAL
Qty: 30 TABLET | Refills: 2 | Status: SHIPPED | OUTPATIENT
Start: 2023-05-15

## 2023-05-15 NOTE — TELEPHONE ENCOUNTER
Medication:   Requested Prescriptions     Pending Prescriptions Disp Refills    ondansetron (ZOFRAN-ODT) 8 MG TBDP disintegrating tablet [Pharmacy Med Name: ONDANSETRON ODT 8MG TABLETS] 30 tablet 2     Sig: DISSOLVE 1 TABLET UNDER THE TONGUE EVERY 8 HOURS AS NEEDED FOR NAUSEA OR VOMITING        Last Filled:  3/2/23    Patient Phone Number: [phone] (home)     Last appt: 1/23/2023   Next appt: Visit date not found    Last OARRS: No flowsheet data found.

## 2023-07-05 RX ORDER — HYDROCHLOROTHIAZIDE 50 MG/1
50 TABLET ORAL DAILY
Qty: 90 TABLET | Refills: 1 | Status: SHIPPED | OUTPATIENT
Start: 2023-07-05

## 2023-07-05 NOTE — TELEPHONE ENCOUNTER
Medication:   Requested Prescriptions     Pending Prescriptions Disp Refills    hydroCHLOROthiazide (HYDRODIURIL) 50 MG tablet [Pharmacy Med Name: HYDROCHLOROTHIAZIDE 50MG TABLETS] 90 tablet 1     Sig: TAKE 1 TABLET BY MOUTH DAILY        Last Filled: 1/3/2023   Last appt: 1/23/2023   Next appt: none

## 2023-08-03 RX ORDER — ONDANSETRON 8 MG/1
TABLET, ORALLY DISINTEGRATING ORAL
Qty: 30 TABLET | Refills: 2 | Status: SHIPPED | OUTPATIENT
Start: 2023-08-03

## 2023-08-14 RX ORDER — LEVOTHYROXINE SODIUM 0.1 MG/1
100 TABLET ORAL DAILY
Qty: 90 TABLET | Refills: 1 | Status: SHIPPED | OUTPATIENT
Start: 2023-08-14

## 2023-08-14 NOTE — TELEPHONE ENCOUNTER
Medication:   Requested Prescriptions     Pending Prescriptions Disp Refills    levothyroxine (SYNTHROID) 100 MCG tablet [Pharmacy Med Name: LEVOTHYROXINE 0.100MG (100MCG) TAB] 90 tablet 1     Sig: TAKE 1 TABLET BY MOUTH DAILY       Last Filled: 3/24/23     Patient Phone Number: [phone] (home)     Last appt: 1/23/2023   Next appt: Visit date not found    Last Thyroid:   Lab Results   Component Value Date/Time    TSH 1.01 04/18/2023 12:17 PM    FT3 3.1 10/15/2012 08:56 AM    B6PYQND 1.14 10/22/2021 11:12 AM    T4FREE 2.1 03/16/2023 10:55 AM    B4WCYZL 5.2 08/19/2010 01:10 PM

## 2023-08-16 NOTE — TELEPHONE ENCOUNTER
Pt's spouse Reji Moon, called requesting that the Metoprolol Rx be called in today. Pt is running out of medication.

## 2023-08-17 NOTE — TELEPHONE ENCOUNTER
Medication:   Requested Prescriptions     Pending Prescriptions Disp Refills    metoprolol tartrate (LOPRESSOR) 25 MG tablet [Pharmacy Med Name: METOPROLOL TARTRATE 25MG  TABLETS] 180 tablet 1     Sig: TAKE 1 TABLET BY MOUTH TWICE DAILY        Last Filled: 1/17/23     Patient Phone Number: [phone] (home)     Last appt: 1/23/2023   Next appt: Visit date not found    Last OARRS: No flowsheet data found.

## 2023-08-30 ENCOUNTER — TELEPHONE (OUTPATIENT)
Dept: ORTHOPEDIC SURGERY | Age: 63
End: 2023-08-30

## 2023-08-30 RX ORDER — SEMAGLUTIDE 2.4 MG/.75ML
2.4 INJECTION, SOLUTION SUBCUTANEOUS
Qty: 3 ML | Refills: 2 | Status: CANCELLED | OUTPATIENT
Start: 2023-08-30

## 2023-08-30 NOTE — TELEPHONE ENCOUNTER
Submitted PA for Wegovy 2.4MG/0.75ML auto-injectors  Via CMM Key: [id number] STATUS: APPROVED  Coverage Start Date:07/31/2023  Coverage End Date:08/29/2024    If this requires a response please respond to the pool ( P [facility] [phone]). Thank you please advise patient.

## 2023-09-19 ENCOUNTER — OFFICE VISIT (OUTPATIENT)
Dept: FAMILY MEDICINE CLINIC | Age: 63
End: 2023-09-19
Payer: COMMERCIAL

## 2023-09-19 ENCOUNTER — HOSPITAL ENCOUNTER (INPATIENT)
Age: 63
LOS: 3 days | Discharge: HOME | End: 2023-09-22
Attending: EMERGENCY MEDICINE | Admitting: INTERNAL MEDICINE
Payer: COMMERCIAL

## 2023-09-19 VITALS
OXYGEN SATURATION: 97 % | BODY MASS INDEX: 27.82 KG/M2 | HEART RATE: 64 BPM | TEMPERATURE: 98.6 F | WEIGHT: 157 LBS | DIASTOLIC BLOOD PRESSURE: 64 MMHG | HEIGHT: 63 IN | SYSTOLIC BLOOD PRESSURE: 102 MMHG

## 2023-09-19 DIAGNOSIS — E83.42 HYPOMAGNESEMIA: ICD-10-CM

## 2023-09-19 DIAGNOSIS — Z87.440 HISTORY OF UTI: ICD-10-CM

## 2023-09-19 DIAGNOSIS — R19.7 DIARRHEA, UNSPECIFIED TYPE: ICD-10-CM

## 2023-09-19 DIAGNOSIS — R11.2 NAUSEA AND VOMITING, UNSPECIFIED VOMITING TYPE: Primary | ICD-10-CM

## 2023-09-19 DIAGNOSIS — R34 DECREASED URINE OUTPUT: ICD-10-CM

## 2023-09-19 DIAGNOSIS — N17.9 AKI (ACUTE KIDNEY INJURY) (HCC): ICD-10-CM

## 2023-09-19 DIAGNOSIS — R11.12 PROJECTILE VOMITING WITH NAUSEA: Primary | ICD-10-CM

## 2023-09-19 DIAGNOSIS — E86.0 DEHYDRATION: ICD-10-CM

## 2023-09-19 PROBLEM — F41.8 OTHER SPECIFIED ANXIETY DISORDERS: Status: ACTIVE | Noted: 2023-09-19

## 2023-09-19 PROBLEM — M54.16 LUMBAR RADICULOPATHY: Status: ACTIVE | Noted: 2023-09-19

## 2023-09-19 PROBLEM — K58.9 IRRITABLE BOWEL SYNDROME: Status: ACTIVE | Noted: 2023-09-19

## 2023-09-19 PROBLEM — R73.02 GLUCOSE INTOLERANCE (IMPAIRED GLUCOSE TOLERANCE): Status: ACTIVE | Noted: 2023-09-19

## 2023-09-19 LAB
ALBUMIN SERPL-MCNC: 3.8 G/DL (ref 3.4–5)
ALP SERPL-CCNC: 67 U/L (ref 40–129)
ALT SERPL-CCNC: <5 U/L (ref 10–40)
AMORPH SED URNS QL MICRO: ABNORMAL /HPF
ANION GAP SERPL CALCULATED.3IONS-SCNC: 13 MMOL/L (ref 3–16)
AST SERPL-CCNC: 8 U/L (ref 15–37)
BACTERIA URNS QL MICRO: ABNORMAL /HPF
BASOPHILS # BLD: 0 K/UL (ref 0–0.2)
BASOPHILS NFR BLD: 0.3 %
BILIRUB DIRECT SERPL-MCNC: <0.2 MG/DL (ref 0–0.3)
BILIRUB INDIRECT SERPL-MCNC: ABNORMAL MG/DL (ref 0–1)
BILIRUB SERPL-MCNC: <0.2 MG/DL (ref 0–1)
BILIRUB UR QL STRIP.AUTO: NEGATIVE
BUN SERPL-MCNC: 18 MG/DL (ref 7–20)
CALCIUM SERPL-MCNC: 9.2 MG/DL (ref 8.3–10.6)
CHLORIDE SERPL-SCNC: 96 MMOL/L (ref 99–110)
CLARITY UR: CLEAR
CO2 SERPL-SCNC: 25 MMOL/L (ref 21–32)
COLOR UR: YELLOW
CREAT SERPL-MCNC: 1.3 MG/DL (ref 0.6–1.2)
DEPRECATED RDW RBC AUTO: 13.3 % (ref 12.4–15.4)
EOSINOPHIL # BLD: 0 K/UL (ref 0–0.6)
EOSINOPHIL NFR BLD: 0.2 %
EPI CELLS #/AREA URNS HPF: ABNORMAL /HPF (ref 0–5)
GFR SERPLBLD CREATININE-BSD FMLA CKD-EPI: 46 ML/MIN/{1.73_M2}
GLUCOSE SERPL-MCNC: 110 MG/DL (ref 70–99)
GLUCOSE UR STRIP.AUTO-MCNC: NEGATIVE MG/DL
HCT VFR BLD AUTO: 28.4 % (ref 36–48)
HGB BLD-MCNC: 9.9 G/DL (ref 12–16)
HGB UR QL STRIP.AUTO: NEGATIVE
KETONES UR STRIP.AUTO-MCNC: NEGATIVE MG/DL
LEUKOCYTE ESTERASE UR QL STRIP.AUTO: ABNORMAL
LIPASE SERPL-CCNC: 11 U/L (ref 13–60)
LYMPHOCYTES # BLD: 1.5 K/UL (ref 1–5.1)
LYMPHOCYTES NFR BLD: 11.5 %
MAGNESIUM SERPL-MCNC: 1.1 MG/DL (ref 1.8–2.4)
MCH RBC QN AUTO: 31.5 PG (ref 26–34)
MCHC RBC AUTO-ENTMCNC: 34.7 G/DL (ref 31–36)
MCV RBC AUTO: 90.7 FL (ref 80–100)
MONOCYTES # BLD: 0.9 K/UL (ref 0–1.3)
MONOCYTES NFR BLD: 6.6 %
MUCOUS THREADS #/AREA URNS LPF: ABNORMAL /LPF
NEUTROPHILS # BLD: 10.8 K/UL (ref 1.7–7.7)
NEUTROPHILS NFR BLD: 81.4 %
NITRITE UR QL STRIP.AUTO: NEGATIVE
PH UR STRIP.AUTO: 6.5 [PH] (ref 5–8)
PHOSPHATE SERPL-MCNC: 2.7 MG/DL (ref 2.5–4.9)
PLATELET # BLD AUTO: 273 K/UL (ref 135–450)
PMV BLD AUTO: 7.8 FL (ref 5–10.5)
POTASSIUM SERPL-SCNC: 3.5 MMOL/L (ref 3.5–5.1)
PROT SERPL-MCNC: 5.9 G/DL (ref 6.4–8.2)
PROT UR STRIP.AUTO-MCNC: ABNORMAL MG/DL
RBC # BLD AUTO: 3.13 M/UL (ref 4–5.2)
RBC #/AREA URNS HPF: ABNORMAL /HPF (ref 0–4)
RENAL EPI CELLS #/AREA UR COMP ASSIST: ABNORMAL /HPF (ref 0–1)
SODIUM SERPL-SCNC: 134 MMOL/L (ref 136–145)
SP GR UR STRIP.AUTO: 1.01 (ref 1–1.03)
UA COMPLETE W REFLEX CULTURE PNL UR: ABNORMAL
UA DIPSTICK W REFLEX MICRO PNL UR: YES
URN SPEC COLLECT METH UR: ABNORMAL
UROBILINOGEN UR STRIP-ACNC: 0.2 E.U./DL
WBC # BLD AUTO: 13.2 K/UL (ref 4–11)
WBC #/AREA URNS HPF: ABNORMAL /HPF (ref 0–5)

## 2023-09-19 PROCEDURE — 80076 HEPATIC FUNCTION PANEL: CPT

## 2023-09-19 PROCEDURE — 87086 URINE CULTURE/COLONY COUNT: CPT

## 2023-09-19 PROCEDURE — 83036 HEMOGLOBIN GLYCOSYLATED A1C: CPT

## 2023-09-19 PROCEDURE — 85025 COMPLETE CBC W/AUTO DIFF WBC: CPT

## 2023-09-19 PROCEDURE — 3078F DIAST BP <80 MM HG: CPT | Performed by: NURSE PRACTITIONER

## 2023-09-19 PROCEDURE — 80048 BASIC METABOLIC PNL TOTAL CA: CPT

## 2023-09-19 PROCEDURE — 83690 ASSAY OF LIPASE: CPT

## 2023-09-19 PROCEDURE — 6370000000 HC RX 637 (ALT 250 FOR IP)

## 2023-09-19 PROCEDURE — 99285 EMERGENCY DEPT VISIT HI MDM: CPT

## 2023-09-19 PROCEDURE — 80307 DRUG TEST PRSMV CHEM ANLYZR: CPT

## 2023-09-19 PROCEDURE — 2580000003 HC RX 258

## 2023-09-19 PROCEDURE — 84100 ASSAY OF PHOSPHORUS: CPT

## 2023-09-19 PROCEDURE — 96365 THER/PROPH/DIAG IV INF INIT: CPT

## 2023-09-19 PROCEDURE — 1200000000 HC SEMI PRIVATE

## 2023-09-19 PROCEDURE — 99213 OFFICE O/P EST LOW 20 MIN: CPT | Performed by: NURSE PRACTITIONER

## 2023-09-19 PROCEDURE — 93005 ELECTROCARDIOGRAM TRACING: CPT

## 2023-09-19 PROCEDURE — 81001 URINALYSIS AUTO W/SCOPE: CPT

## 2023-09-19 PROCEDURE — 3074F SYST BP LT 130 MM HG: CPT | Performed by: NURSE PRACTITIONER

## 2023-09-19 PROCEDURE — 87077 CULTURE AEROBIC IDENTIFY: CPT

## 2023-09-19 PROCEDURE — 84300 ASSAY OF URINE SODIUM: CPT

## 2023-09-19 PROCEDURE — 83935 ASSAY OF URINE OSMOLALITY: CPT

## 2023-09-19 PROCEDURE — 96361 HYDRATE IV INFUSION ADD-ON: CPT

## 2023-09-19 PROCEDURE — 87186 SC STD MICRODIL/AGAR DIL: CPT

## 2023-09-19 PROCEDURE — 96372 THER/PROPH/DIAG INJ SC/IM: CPT

## 2023-09-19 PROCEDURE — 83735 ASSAY OF MAGNESIUM: CPT

## 2023-09-19 PROCEDURE — 82570 ASSAY OF URINE CREATININE: CPT

## 2023-09-19 PROCEDURE — 36415 COLL VENOUS BLD VENIPUNCTURE: CPT

## 2023-09-19 PROCEDURE — 6360000002 HC RX W HCPCS

## 2023-09-19 RX ORDER — DULOXETIN HYDROCHLORIDE 60 MG/1
120 CAPSULE, DELAYED RELEASE ORAL DAILY
Status: DISCONTINUED | OUTPATIENT
Start: 2023-09-20 | End: 2023-09-22 | Stop reason: HOSPADM

## 2023-09-19 RX ORDER — LAMOTRIGINE 100 MG/1
200 TABLET ORAL DAILY
Status: DISCONTINUED | OUTPATIENT
Start: 2023-09-20 | End: 2023-09-22 | Stop reason: HOSPADM

## 2023-09-19 RX ORDER — PROCHLORPERAZINE EDISYLATE 5 MG/ML
10 INJECTION INTRAMUSCULAR; INTRAVENOUS EVERY 6 HOURS PRN
Status: DISCONTINUED | OUTPATIENT
Start: 2023-09-19 | End: 2023-09-20 | Stop reason: SDUPTHER

## 2023-09-19 RX ORDER — ACETAMINOPHEN 650 MG/1
650 SUPPOSITORY RECTAL EVERY 6 HOURS PRN
Status: DISCONTINUED | OUTPATIENT
Start: 2023-09-19 | End: 2023-09-22 | Stop reason: HOSPADM

## 2023-09-19 RX ORDER — DILTIAZEM HYDROCHLORIDE 180 MG/1
360 CAPSULE, COATED, EXTENDED RELEASE ORAL DAILY
Status: DISCONTINUED | OUTPATIENT
Start: 2023-09-20 | End: 2023-09-22 | Stop reason: HOSPADM

## 2023-09-19 RX ORDER — DEXTROSE MONOHYDRATE 100 MG/ML
INJECTION, SOLUTION INTRAVENOUS CONTINUOUS PRN
Status: DISCONTINUED | OUTPATIENT
Start: 2023-09-19 | End: 2023-09-22 | Stop reason: HOSPADM

## 2023-09-19 RX ORDER — ENOXAPARIN SODIUM 100 MG/ML
40 INJECTION SUBCUTANEOUS DAILY
Status: DISCONTINUED | OUTPATIENT
Start: 2023-09-20 | End: 2023-09-22 | Stop reason: HOSPADM

## 2023-09-19 RX ORDER — ACETAMINOPHEN 325 MG/1
650 TABLET ORAL EVERY 6 HOURS PRN
Status: DISCONTINUED | OUTPATIENT
Start: 2023-09-19 | End: 2023-09-22 | Stop reason: HOSPADM

## 2023-09-19 RX ORDER — INSULIN LISPRO 100 [IU]/ML
0-4 INJECTION, SOLUTION INTRAVENOUS; SUBCUTANEOUS NIGHTLY
Status: DISCONTINUED | OUTPATIENT
Start: 2023-09-20 | End: 2023-09-22 | Stop reason: HOSPADM

## 2023-09-19 RX ORDER — ONDANSETRON 4 MG/1
4 TABLET, ORALLY DISINTEGRATING ORAL ONCE
Status: COMPLETED | OUTPATIENT
Start: 2023-09-19 | End: 2023-09-19

## 2023-09-19 RX ORDER — INSULIN LISPRO 100 [IU]/ML
0-4 INJECTION, SOLUTION INTRAVENOUS; SUBCUTANEOUS
Status: DISCONTINUED | OUTPATIENT
Start: 2023-09-20 | End: 2023-09-22 | Stop reason: HOSPADM

## 2023-09-19 RX ORDER — MAGNESIUM SULFATE IN WATER 40 MG/ML
2000 INJECTION, SOLUTION INTRAVENOUS ONCE
Status: DISCONTINUED | OUTPATIENT
Start: 2023-09-20 | End: 2023-09-20

## 2023-09-19 RX ORDER — SODIUM CHLORIDE 9 MG/ML
INJECTION, SOLUTION INTRAVENOUS PRN
Status: DISCONTINUED | OUTPATIENT
Start: 2023-09-19 | End: 2023-09-22 | Stop reason: HOSPADM

## 2023-09-19 RX ORDER — SODIUM CHLORIDE 0.9 % (FLUSH) 0.9 %
5-40 SYRINGE (ML) INJECTION PRN
Status: DISCONTINUED | OUTPATIENT
Start: 2023-09-19 | End: 2023-09-22 | Stop reason: HOSPADM

## 2023-09-19 RX ORDER — PROMETHAZINE HYDROCHLORIDE 25 MG/ML
12.5 INJECTION, SOLUTION INTRAMUSCULAR; INTRAVENOUS ONCE
Status: COMPLETED | OUTPATIENT
Start: 2023-09-19 | End: 2023-09-19

## 2023-09-19 RX ORDER — MAGNESIUM SULFATE IN WATER 40 MG/ML
2000 INJECTION, SOLUTION INTRAVENOUS PRN
Status: DISCONTINUED | OUTPATIENT
Start: 2023-09-19 | End: 2023-09-22 | Stop reason: HOSPADM

## 2023-09-19 RX ORDER — CEPHALEXIN 500 MG/1
500 TABLET ORAL 2 TIMES DAILY
COMMUNITY
Start: 2023-09-13 | End: 2023-09-20

## 2023-09-19 RX ORDER — LEVOTHYROXINE SODIUM 0.1 MG/1
100 TABLET ORAL DAILY
Status: DISCONTINUED | OUTPATIENT
Start: 2023-09-20 | End: 2023-09-22 | Stop reason: HOSPADM

## 2023-09-19 RX ORDER — SODIUM CHLORIDE 0.9 % (FLUSH) 0.9 %
5-40 SYRINGE (ML) INJECTION EVERY 12 HOURS SCHEDULED
Status: DISCONTINUED | OUTPATIENT
Start: 2023-09-20 | End: 2023-09-22 | Stop reason: HOSPADM

## 2023-09-19 RX ORDER — ONDANSETRON 4 MG/1
4 TABLET, ORALLY DISINTEGRATING ORAL EVERY 8 HOURS PRN
Status: DISCONTINUED | OUTPATIENT
Start: 2023-09-19 | End: 2023-09-22 | Stop reason: HOSPADM

## 2023-09-19 RX ORDER — SODIUM CHLORIDE, SODIUM LACTATE, POTASSIUM CHLORIDE, CALCIUM CHLORIDE 600; 310; 30; 20 MG/100ML; MG/100ML; MG/100ML; MG/100ML
INJECTION, SOLUTION INTRAVENOUS CONTINUOUS
Status: ACTIVE | OUTPATIENT
Start: 2023-09-20 | End: 2023-09-20

## 2023-09-19 RX ORDER — ONDANSETRON 2 MG/ML
4 INJECTION INTRAMUSCULAR; INTRAVENOUS EVERY 6 HOURS PRN
Status: DISCONTINUED | OUTPATIENT
Start: 2023-09-19 | End: 2023-09-22 | Stop reason: HOSPADM

## 2023-09-19 RX ORDER — SODIUM CHLORIDE, SODIUM LACTATE, POTASSIUM CHLORIDE, AND CALCIUM CHLORIDE .6; .31; .03; .02 G/100ML; G/100ML; G/100ML; G/100ML
1000 INJECTION, SOLUTION INTRAVENOUS ONCE
Status: COMPLETED | OUTPATIENT
Start: 2023-09-19 | End: 2023-09-19

## 2023-09-19 RX ORDER — BUPROPION HYDROCHLORIDE 150 MG/1
300 TABLET ORAL DAILY
Status: DISCONTINUED | OUTPATIENT
Start: 2023-09-20 | End: 2023-09-20

## 2023-09-19 RX ADMIN — SODIUM CHLORIDE, POTASSIUM CHLORIDE, SODIUM LACTATE AND CALCIUM CHLORIDE 1000 ML: 600; 310; 30; 20 INJECTION, SOLUTION INTRAVENOUS at 20:32

## 2023-09-19 RX ADMIN — PROMETHAZINE HYDROCHLORIDE 12.5 MG: 25 INJECTION INTRAMUSCULAR; INTRAVENOUS at 20:33

## 2023-09-19 RX ADMIN — MAGNESIUM SULFATE HEPTAHYDRATE 2000 MG: 40 INJECTION, SOLUTION INTRAVENOUS at 22:53

## 2023-09-19 RX ADMIN — ONDANSETRON 4 MG: 4 TABLET, ORALLY DISINTEGRATING ORAL at 23:45

## 2023-09-19 ASSESSMENT — ENCOUNTER SYMPTOMS
RHINORRHEA: 0
EYE DISCHARGE: 0
ABDOMINAL PAIN: 0
DIARRHEA: 1
EYE ITCHING: 0
COUGH: 0
SORE THROAT: 0
BACK PAIN: 0
CONSTIPATION: 0
EYE PAIN: 0
SHORTNESS OF BREATH: 0
NAUSEA: 1
WHEEZING: 0

## 2023-09-19 ASSESSMENT — PAIN - FUNCTIONAL ASSESSMENT: PAIN_FUNCTIONAL_ASSESSMENT: NONE - DENIES PAIN

## 2023-09-19 NOTE — PROGRESS NOTES
Eulalia Lopez (:  1960) is a 58 y.o. female,Established patient, here for evaluation of the following chief complaint(s):  Follow-Up from Hospital (Nausea and vomiting, unspecified vomiting type (Primary Dx); /UTI (urinary tract infection), uncomplicated  /DI has been vomiting  with diarrhea since 9/10/23  pts eyes feel heavy with headache /Nausea with movement )       ASSESSMENT/PLAN:  1. Projectile vomiting with nausea  Not progressing; Patient barely drank 4 oz gatorade today and ate part of a bagel with 2 zofran. Not urinating. Patient to go directly to the ER for fluids and evaluation. 2. Diarrhea, unspecified type  Not progressing; See 1  3. Decreased urine output  Not progressing;  See 1  4. History of UTI  Stable;  Reviewed previous UA, no culture sent. Patient should have 2 doses of abx leftover. Tried to get urine sample, however patient unable to leave one. No follow-ups on file. Subjective   SUBJECTIVE/OBJECTIVE:  HPI  Nausea and vomiting- started 2-3 days before the ER 2023  Projectile vomiting, couldn't keep anything down- has had a little gatorade today (4 oz); 1 side of bagel (took 2 Zofran- helped)  Yesterday tried to eat a little more- by the time she got home projectiled the whole meal 5-6x; then needed to lie down  Diarrhea today; comes when sitting down  Has not urinated today    CT abdomen- negative    UTI 2023- gave Rocephin in the ER, started on cephalexin (was suppose to have 2 pills left- threw them away)    Review of Systems       Objective   Physical Exam  Vitals reviewed. Constitutional:       Appearance: She is ill-appearing. Comments: Shaking, eyes closed with head in hands when talking, appears weak/ unsteady   HENT:      Head: Normocephalic. Right Ear: External ear normal.      Left Ear: External ear normal.      Nose: Nose normal.   Pulmonary:      Effort: No respiratory distress.    Psychiatric:         Mood and Affect: Mood

## 2023-09-19 NOTE — ED PROVIDER NOTES
[facility] ENCOUNTER          PHYSICIAN ASSISTANT NOTE       Date of evaluation: 9/19/2023    Chief Complaint     Emesis (Pt reports N/V/D x10 days was seen at [address] [place] [address] and discharged home with antibiotics (Cephalxin) Today was last dose of her antibiotics and still not feeling well.)      History of Present Illness     Gretta Estrada is a 58 y.o. female with a history of hypertension, irritable bowel syndrome who presents complaint of nausea, emesis. The patient was seen at the [facility] emergency department on 9/12 for the same symptoms and was diagnosed with a urinary tract infection and was started on Keflex. The patient had symptom improvement in the ER and was discharged with oral Keflex to go home with. The patient also did have an unremarkable CT abdomen and pelvis at that time. The patient states that initially, she did feel better with the Keflex after a couple of days but about 3 days ago her symptoms returned and are simply the same as before she started treatment. She states that she has emesis anytime that she eats or drinks anything. She also has several episodes of diarrhea without hematochezia or melena. She does not have any abdominal pain, fever or chills. She does not have any dysuria, hematuria, flank pain. Also denies any COVID symptoms including rhinorrhea, congestion, cough, myalgias. The patient also is on Wegovy, she has been on this since April. Her physician that she saw at [facility] was considering either way could be side effect versus urinary tract infection however the patient has not been taking this in about 2 and half weeks. She has lost 60 pounds ever since she started the [facility] in April, less than 6 months ago. She denies marijuana or CBD use. Review of Systems     Review of Systems   Constitutional:  Negative for chills, fatigue and fever. HENT:  Negative for congestion, rhinorrhea and sore throat.     Eyes: 58 y.o. female that presents the emergency department with nausea, vomiting, diarrhea. The patient was seen at the [facility] emergency department on 9/12, had a CT abdomen and pelvis which was unremarkable and then was diagnosed with a urinary tract infection and was started on Keflex. The patient initially had improvement of symptoms but 3 days ago had a recurrence of nausea, vomiting and has not been able to tolerate any p.o. intake. Please see HPI for further details. On my physical examination, the patient is hemodynamically stable. Overall well-appearing. She does have dry mucous membranes but otherwise has no abdominal or CVA tenderness. Labs including CBC, CMP, lipase, urinalysis, magnesium and phosphorus were obtained. CBC shows leukocytosis to 13.2. CBC also does show that she has had an two-point drop in hemoglobin since just 9/12, she was 11.9 at [facility] and this is about her baseline. Today she is at 9.9. The patient does not have any hematochezia, melena or other signs of bleeding. Her urinalysis actually looks improved, she only a small leukocyte esterase, 6-9 white blood cells but is also contaminated with 6-10 squames. This will be sent for culture but I do not feel that this represents refractory urinary tract infection. Labs also reveal severe hypomagnesemia at 1.1, IV replacement has been ordered, fortunately potassium and phosphorus are normal.  Kidney function also reveals that she has an JERALD with a creatinine of 1.3, up from about 1 baseline, this is very likely to be prerenal in the setting of decreased oral intake. Otherwise labs reassuring, lipase and LFTs unremarkable. Upon reassessment, the patient did get Phenergan and IV fluids and did have some improvement of her nausea.   However given severe hypomagnesemia, persistent nausea/vomiting without a clear etiology and JERALD, the patient would benefit from admission for continued IV fluids, antiemetic management and

## 2023-09-20 LAB
ALBUMIN SERPL-MCNC: 3.4 G/DL (ref 3.4–5)
AMPHETAMINES UR QL SCN>1000 NG/ML: NORMAL
ANION GAP SERPL CALCULATED.3IONS-SCNC: 13 MMOL/L (ref 3–16)
BARBITURATES UR QL SCN>200 NG/ML: NORMAL
BASOPHILS # BLD: 0 K/UL (ref 0–0.2)
BASOPHILS NFR BLD: 0.3 %
BENZODIAZ UR QL SCN>200 NG/ML: NORMAL
BUN SERPL-MCNC: 14 MG/DL (ref 7–20)
C DIFF TOX A+B STL QL IA: ABNORMAL
CALCIUM SERPL-MCNC: 9.2 MG/DL (ref 8.3–10.6)
CANNABINOIDS UR QL SCN>50 NG/ML: NORMAL
CHLORIDE SERPL-SCNC: 99 MMOL/L (ref 99–110)
CO2 SERPL-SCNC: 25 MMOL/L (ref 21–32)
COCAINE UR QL SCN: NORMAL
CREAT SERPL-MCNC: 1.2 MG/DL (ref 0.6–1.2)
CREAT UR-MCNC: 132.7 MG/DL (ref 28–259)
DEPRECATED RDW RBC AUTO: 13 % (ref 12.4–15.4)
DRUG SCREEN COMMENT UR-IMP: NORMAL
EKG ATRIAL RATE: 72 BPM
EKG DIAGNOSIS: NORMAL
EKG P AXIS: 42 DEGREES
EKG P-R INTERVAL: 194 MS
EKG Q-T INTERVAL: 422 MS
EKG QRS DURATION: 82 MS
EKG QTC CALCULATION (BAZETT): 462 MS
EKG R AXIS: 15 DEGREES
EKG T AXIS: 49 DEGREES
EKG VENTRICULAR RATE: 72 BPM
EOSINOPHIL # BLD: 0.1 K/UL (ref 0–0.6)
EOSINOPHIL NFR BLD: 0.7 %
EST. AVERAGE GLUCOSE BLD GHB EST-MCNC: 93.9 MG/DL
FENTANYL SCREEN, URINE: NORMAL
GFR SERPLBLD CREATININE-BSD FMLA CKD-EPI: 50 ML/MIN/{1.73_M2}
GLUCOSE BLD-MCNC: 101 MG/DL (ref 70–99)
GLUCOSE BLD-MCNC: 148 MG/DL (ref 70–99)
GLUCOSE BLD-MCNC: 88 MG/DL (ref 70–99)
GLUCOSE BLD-MCNC: 91 MG/DL (ref 70–99)
GLUCOSE BLD-MCNC: 94 MG/DL (ref 70–99)
GLUCOSE SERPL-MCNC: 131 MG/DL (ref 70–99)
HBA1C MFR BLD: 4.9 %
HCT VFR BLD AUTO: 26.8 % (ref 36–48)
HGB BLD-MCNC: 9.6 G/DL (ref 12–16)
LACTATE BLDV-SCNC: 2.7 MMOL/L (ref 0.4–2)
LYMPHOCYTES # BLD: 1.5 K/UL (ref 1–5.1)
LYMPHOCYTES NFR BLD: 14.9 %
MAGNESIUM SERPL-MCNC: 1.9 MG/DL (ref 1.8–2.4)
MCH RBC QN AUTO: 32.2 PG (ref 26–34)
MCHC RBC AUTO-ENTMCNC: 35.8 G/DL (ref 31–36)
MCV RBC AUTO: 90 FL (ref 80–100)
METHADONE UR QL SCN>300 NG/ML: NORMAL
MONOCYTES # BLD: 0.7 K/UL (ref 0–1.3)
MONOCYTES NFR BLD: 6.5 %
NEUTROPHILS # BLD: 8 K/UL (ref 1.7–7.7)
NEUTROPHILS NFR BLD: 77.6 %
OPIATES UR QL SCN>300 NG/ML: NORMAL
OSMOLALITY UR: 283 MOSM/KG (ref 390–1070)
OXYCODONE UR QL SCN: NORMAL
PCP UR QL SCN>25 NG/ML: NORMAL
PERFORMED ON: ABNORMAL
PERFORMED ON: ABNORMAL
PERFORMED ON: NORMAL
PH UR STRIP: 6 [PH]
PHOSPHATE SERPL-MCNC: 2.2 MG/DL (ref 2.5–4.9)
PLATELET # BLD AUTO: 261 K/UL (ref 135–450)
PMV BLD AUTO: 8 FL (ref 5–10.5)
POTASSIUM SERPL-SCNC: 3.1 MMOL/L (ref 3.5–5.1)
RBC # BLD AUTO: 2.98 M/UL (ref 4–5.2)
SODIUM SERPL-SCNC: 137 MMOL/L (ref 136–145)
SODIUM UR-SCNC: 25 MMOL/L
WBC # BLD AUTO: 10.3 K/UL (ref 4–11)

## 2023-09-20 PROCEDURE — 6360000002 HC RX W HCPCS: Performed by: INTERNAL MEDICINE

## 2023-09-20 PROCEDURE — 6370000000 HC RX 637 (ALT 250 FOR IP): Performed by: INTERNAL MEDICINE

## 2023-09-20 PROCEDURE — 2580000003 HC RX 258: Performed by: INTERNAL MEDICINE

## 2023-09-20 PROCEDURE — 36415 COLL VENOUS BLD VENIPUNCTURE: CPT

## 2023-09-20 PROCEDURE — 83605 ASSAY OF LACTIC ACID: CPT

## 2023-09-20 PROCEDURE — 87449 NOS EACH ORGANISM AG IA: CPT

## 2023-09-20 PROCEDURE — 6370000000 HC RX 637 (ALT 250 FOR IP): Performed by: NURSE PRACTITIONER

## 2023-09-20 PROCEDURE — 80069 RENAL FUNCTION PANEL: CPT

## 2023-09-20 PROCEDURE — 83735 ASSAY OF MAGNESIUM: CPT

## 2023-09-20 PROCEDURE — 87324 CLOSTRIDIUM AG IA: CPT

## 2023-09-20 PROCEDURE — 6360000002 HC RX W HCPCS

## 2023-09-20 PROCEDURE — 1200000000 HC SEMI PRIVATE

## 2023-09-20 PROCEDURE — 85025 COMPLETE CBC W/AUTO DIFF WBC: CPT

## 2023-09-20 RX ORDER — POTASSIUM CHLORIDE 20 MEQ/1
40 TABLET, EXTENDED RELEASE ORAL ONCE
Status: COMPLETED | OUTPATIENT
Start: 2023-09-20 | End: 2023-09-20

## 2023-09-20 RX ORDER — BUPROPION HYDROCHLORIDE 150 MG/1
150 TABLET ORAL DAILY
Status: DISCONTINUED | OUTPATIENT
Start: 2023-09-20 | End: 2023-09-22 | Stop reason: HOSPADM

## 2023-09-20 RX ORDER — SODIUM CHLORIDE 9 MG/ML
INJECTION, SOLUTION INTRAVENOUS CONTINUOUS
Status: DISCONTINUED | OUTPATIENT
Start: 2023-09-20 | End: 2023-09-22 | Stop reason: HOSPADM

## 2023-09-20 RX ORDER — MAGNESIUM SULFATE IN WATER 40 MG/ML
2000 INJECTION, SOLUTION INTRAVENOUS ONCE
Status: COMPLETED | OUTPATIENT
Start: 2023-09-20 | End: 2023-09-20

## 2023-09-20 RX ADMIN — SODIUM CHLORIDE, POTASSIUM CHLORIDE, SODIUM LACTATE AND CALCIUM CHLORIDE: 600; 310; 30; 20 INJECTION, SOLUTION INTRAVENOUS at 09:04

## 2023-09-20 RX ADMIN — METOPROLOL TARTRATE 25 MG: 25 TABLET, FILM COATED ORAL at 01:41

## 2023-09-20 RX ADMIN — FIDAXOMICIN 200 MG: 200 TABLET, FILM COATED ORAL at 13:12

## 2023-09-20 RX ADMIN — SODIUM CHLORIDE: 9 INJECTION, SOLUTION INTRAVENOUS at 15:36

## 2023-09-20 RX ADMIN — MAGNESIUM SULFATE HEPTAHYDRATE 2000 MG: 40 INJECTION, SOLUTION INTRAVENOUS at 00:16

## 2023-09-20 RX ADMIN — MAGNESIUM SULFATE HEPTAHYDRATE 2000 MG: 40 INJECTION, SOLUTION INTRAVENOUS at 10:21

## 2023-09-20 RX ADMIN — DULOXETINE HYDROCHLORIDE 120 MG: 60 CAPSULE, DELAYED RELEASE ORAL at 08:48

## 2023-09-20 RX ADMIN — BUPROPION HYDROCHLORIDE 150 MG: 150 TABLET, FILM COATED, EXTENDED RELEASE ORAL at 08:48

## 2023-09-20 RX ADMIN — DILTIAZEM HYDROCHLORIDE 360 MG: 120 CAPSULE, COATED, EXTENDED RELEASE ORAL at 08:48

## 2023-09-20 RX ADMIN — ENOXAPARIN SODIUM 40 MG: 100 INJECTION SUBCUTANEOUS at 08:48

## 2023-09-20 RX ADMIN — LAMOTRIGINE 200 MG: 100 TABLET ORAL at 08:49

## 2023-09-20 RX ADMIN — METOPROLOL TARTRATE 25 MG: 25 TABLET, FILM COATED ORAL at 08:49

## 2023-09-20 RX ADMIN — LEVOTHYROXINE SODIUM 100 MCG: 100 TABLET ORAL at 08:49

## 2023-09-20 RX ADMIN — POTASSIUM CHLORIDE 40 MEQ: 1500 TABLET, EXTENDED RELEASE ORAL at 13:12

## 2023-09-20 RX ADMIN — SODIUM CHLORIDE, POTASSIUM CHLORIDE, SODIUM LACTATE AND CALCIUM CHLORIDE 1000 ML: 600; 310; 30; 20 INJECTION, SOLUTION INTRAVENOUS at 00:14

## 2023-09-20 RX ADMIN — FIDAXOMICIN 200 MG: 200 TABLET, FILM COATED ORAL at 20:23

## 2023-09-20 NOTE — PROGRESS NOTES
Pt a/o x4. VSS. Denies N/V. Pt having frequent diarrhea. No complaints of pain at this time. Pt ambulated independently. Will continue to monitor patient.      Aravind Jacobs RN

## 2023-09-20 NOTE — H&P
V2.0  History and Physical      Name:  Charlotte Lance /Age/Sex: 1960  (58 y.o. female)   MRN & CSN:  [id number] & [id number] Encounter Date/Time: 2023 11:59 PM EDT   Location:  [place] [address] PCP: Toni Calzada MD       Hospital Day: 1    Assessment and Plan:   Charlotte Lance is a 58 y.o. female with a pmh of chronic low back pain, IBS, anxiety disorder who presents with JERALD (acute kidney injury) ([address]) secondary to nausea, vomiting and watery diarrhea for the past 10 days. Patient denies any abdominal pain, fevers, chills, melena, hematochezia or hematemesis. Of significance, she has been exposed to her son who is being treated for C. difficile colitis. She does feel lightheaded but no syncope. In emergency room her temperature 98.3, blood pressure 120/78, pulse 78, respirations 16, sats 97% on room air. Hospital Problems             Last Modified POA    * (Principal) JERALD (acute kidney injury) ([address]) 2023 Yes    HTN (hypertension) 2023 Yes    Intractable nausea and vomiting 2023 Yes    Irritable bowel syndrome 2023 Yes    Acquired hypothyroidism 2023 Yes    Lumbar radiculopathy 2023 Yes    Glucose intolerance (impaired glucose tolerance) 2023 Yes    Other specified anxiety disorders 2023 Yes       Plan:  IV hydration  Check fractional excretion of sodium, trend creatinine,  Strict I's and O's check PVR  Avoid nephrotoxic agents including diuretics, NSAIDs, IV contrast  If not improved by a.m. will consider nephrology consult  Antiemetics, may consider GI consult since symptoms have been going on for months  Check stool studies, contact precautions. Resume home regimen for chronic stable conditions except for for the above-mentioned modifications    Disposition:   Current Living situation: Home  Expected Disposition: Home  Estimated D/C: 3 to 4 days    Diet ADULT DIET;  Clear Liquid   DVT Prophylaxis [x] Lovenox, []  Heparin, [] SCDs, [] Ambulation, Unremarkable. Kidneys/ureters/bladder:  Unremarkable kidneys accounting for mildly limited assessment due to streak artifacts from the spinal hardware. Under distended bladder demonstrating mild diffuse wall thickening. Gastrointestinal tract:  Normal caliber bowel loops without focal wall thickening. Normal appendix. Lymphatics:  No enlarged lymph nodes by size criteria. Vasculature:  Normal caliber abdominal aorta. Peritoneum/Retroperitoneum:  No free air. No free fluid. No loculated fluid collection. Abdominal wall/soft tissues:  Nothing acute. Postsurgical scarring in the lumbar back soft tissues. Genital Structures:  Uterus is surgically absent. No adnexal masses. Osseous structures:  No focal suspicious osseous lesion. Postsurgical changes of T12-L3 posterior fusion, chronic L1 compression deformity status post kyphoplasty. Tip of the left L2 pedicle screw slightly extends beyond the anterior vertebral body cortex (series 301 image 47) in close proximity to the posterior aortic wall without abutment. Lower lumbar predominant degenerative changes. Grade 1 degenerative anterolisthesis of L5 over S1. Bony island in S1. Bilateral sacroiliac arthrosis. 150 IMPRESSION: 1. No acute intra-abdominal or intrapelvic abnormality. 2.  Ancillary findings as above. Approved by Kristin Virk MD on 9/13/2023 1:54 AM EDT I have personally reviewed the images and I agree with this report.  Report Verified by: Bethany Zheng MD at 9/13/2023 2:11 AM          Electronically signed by Kaylee Boggs MD on 9/19/2023 at 11:59 PM

## 2023-09-20 NOTE — PLAN OF CARE
[facility] Hospitalist brief note  Consult received. Case reviewed with ER physician  78-year-old female coming in with 10 days of nausea and vomiting and new JERALD. Full note to follow.     Katrin Acevedo MD    Thanks  Aylin Martinez MD

## 2023-09-20 NOTE — PROGRESS NOTES
V2.0    [facility] Progress Note      Name:  Gretta Estrada /Age/Sex: 1960  (58 y.o. female)   MRN & CSN:  [id number] & [id number] Encounter Date/Time: 2023 2:54 PM EDT   Location:  [place] [address] PCP: Leonie Mac MD     [address] [place] [address]       Hospital Day: 2    Assessment and Recommendations   Gretta Estrada is a 58 y.o. female with pmh of prediabetic state, hypothyroidism, chronic low back pain, irritable bowel syndrome and anxiety disorder who presents with nausea, vomiting and diarrhea for 10 days. Stool for C. difficile toxin is positive. Creatinine 1.3 on admission. 1.  C. difficile diarrhea: Liquid diet for now. Start on Dificid. Antiemetic. Monitor chemistry daily. 2.  Acute kidney injury: Due to GI fluid loss. Hold HCTZ, Mobic and metformin. Continue IV fluid hydration. Monitor chemistry daily. 3.  Hypomagnesemia/hypokalemia: Due to GI loss. Replace and monitor. 4.  Normocytic anemia: No obvious bleeding. Monitor CBC and send anemia work-up. 5.  Prediabetic state: Hold metformin for now. Monitor glucose and cover with insulin sliding scale. Current problems include hypothyroidism, chronic lower back pain, irritable bowel syndrome and anxiety disorder. Diet ADULT DIET; Full Liquid   DVT Prophylaxis [x] Lovenox, []  Heparin, [] SCDs, [] Ambulation,  [] Eliquis, [] Xarelto  [] Coumadin   Code Status Full Code   Disposition From: She lives with her . Expected Disposition: Home  Estimated Date of Discharge: 2 to 3 days  Patient requires continued admission due to C. difficile diarrhea. Surrogate Decision Maker/ POA       Personally reviewed Lab Studies and Imaging             Subjective:     Chief Complaint: Patient is seen in the ER.  at bedside. She has been having diarrhea about 5 times a day and multiple episodes of vomiting each day. No hematemesis or rectal bleeding. No fever or chills.     She has been in contact with

## 2023-09-20 NOTE — PROGRESS NOTES
Pt admitted to room 6307 in bed. Pt alert and oriented.  at bedside. Cdiff precautions in place. Tele monitor applied, rate and rhythm verified with monitor watcher. Pt denies needs at this time. Call light in reach.

## 2023-09-20 NOTE — ED NOTES
Called lab at this time to verify they had specimens for additional lab work ordered at admission. Confirmed they will be ran.       Manda Fernandez RN  09/20/23 9861

## 2023-09-20 NOTE — ED PROVIDER NOTES
ED Attending Attestation Note     Date of evaluation: 9/19/2023    This patient was seen by the advance practice provider. I have seen and examined the patient, agree with the workup, evaluation, management and diagnosis. The care plan has been discussed. My assessment reveals patient with recent UTI treated with antibiotics here with nausea and vomiting now going on for 10 days. Patient is unable to keep anything down. She is found to be hypomagnesemic and replacement ordered along with IV fluids. Patient will need to be admitted for further ongoing treatment since not tolerating PO.      Austin Betts MD  09/19/23 5520

## 2023-09-20 NOTE — PROGRESS NOTES
4 Eyes Skin Assessment     NAME:  Chicho Mooney  YOB: 1960  MEDICAL RECORD NUMBER:  [id number]    The patient is being assessed for  Admission    I agree that at least one RN has performed a thorough Head to Toe Skin Assessment on the patient. ALL assessment sites listed below have been assessed. Areas assessed by both nurses:    Head, Face, Ears, Shoulders, Back, Chest, Arms, Elbows, Hands, Sacrum. Buttock, Coccyx, Ischium, Legs. Feet and Heels, and Under Medical Devices         Does the Patient have a Wound?  No noted wound(s), pt does have scattered scabs/abrasions she states are from bug bites       Alex Prevention initiated by RN: No  Wound Care Orders initiated by RN: No    Pressure Injury (Stage 3,4, Unstageable, DTI, NWPT, and Complex wounds) if present, place Wound referral order by RN under : No    New Ostomies, if present place, Ostomy referral order under : No     Nurse 1 eSignature: Electronically signed by Bjorn Alvarez RN on 9/20/23 at 5:18 PM EDT    **SHARE this note so that the co-signing nurse can place an eSignature**    Nurse 2 eSignature: Electronically signed by Vidal Quispe RN on 9/20/23 at 6:26 PM EDT

## 2023-09-20 NOTE — PLAN OF CARE
Problem: Safety - Adult  Goal: Free from fall injury  9/20/2023 1146 by Deepthi Code  Outcome: Adequate for Discharge  Note: Pt is a low fall risk and up ab anthony. Will continue to implement safety precautions. Problem: Pain  Goal: Verbalizes/displays adequate comfort level or baseline comfort level  9/20/2023 1146 by Deepthi Code  Outcome: Adequate for Discharge  Note: No complaints of pain.   9/20/2023 0411 by Bernie Frankel RN  Outcome: Progressing

## 2023-09-21 LAB
BACTERIA UR CULT: ABNORMAL
FERRITIN SERPL IA-MCNC: 85.9 NG/ML (ref 15–150)
FOLATE SERPL-MCNC: >20 NG/ML (ref 4.78–24.2)
GLUCOSE BLD-MCNC: 102 MG/DL (ref 70–99)
GLUCOSE BLD-MCNC: 124 MG/DL (ref 70–99)
GLUCOSE BLD-MCNC: 78 MG/DL (ref 70–99)
GLUCOSE BLD-MCNC: 88 MG/DL (ref 70–99)
HCT VFR BLD AUTO: 27.2 % (ref 36–48)
IMMATURE RETIC FRACT: 0.48 (ref 0.21–0.37)
IRON SATN MFR SERPL: 18 % (ref 15–50)
IRON SERPL-MCNC: 31 UG/DL (ref 37–145)
ORGANISM: ABNORMAL
PERFORMED ON: ABNORMAL
PERFORMED ON: ABNORMAL
PERFORMED ON: NORMAL
PERFORMED ON: NORMAL
RETICS # AUTO: 0.05 M/UL (ref 0.02–0.1)
RETICS/RBC NFR AUTO: 1.67 % (ref 0.5–2.18)
TIBC SERPL-MCNC: 168 UG/DL (ref 260–445)
VIT B12 SERPL-MCNC: 1620 PG/ML (ref 211–911)

## 2023-09-21 PROCEDURE — 85045 AUTOMATED RETICULOCYTE COUNT: CPT

## 2023-09-21 PROCEDURE — 82746 ASSAY OF FOLIC ACID SERUM: CPT

## 2023-09-21 PROCEDURE — 83540 ASSAY OF IRON: CPT

## 2023-09-21 PROCEDURE — 1200000000 HC SEMI PRIVATE

## 2023-09-21 PROCEDURE — 6370000000 HC RX 637 (ALT 250 FOR IP): Performed by: NURSE PRACTITIONER

## 2023-09-21 PROCEDURE — 83550 IRON BINDING TEST: CPT

## 2023-09-21 PROCEDURE — 6370000000 HC RX 637 (ALT 250 FOR IP): Performed by: INTERNAL MEDICINE

## 2023-09-21 PROCEDURE — 36415 COLL VENOUS BLD VENIPUNCTURE: CPT

## 2023-09-21 PROCEDURE — 2580000003 HC RX 258: Performed by: INTERNAL MEDICINE

## 2023-09-21 PROCEDURE — 82728 ASSAY OF FERRITIN: CPT

## 2023-09-21 PROCEDURE — 6360000002 HC RX W HCPCS: Performed by: INTERNAL MEDICINE

## 2023-09-21 PROCEDURE — 82607 VITAMIN B-12: CPT

## 2023-09-21 RX ORDER — GABAPENTIN 100 MG/1
200 CAPSULE ORAL 2 TIMES DAILY
Status: DISCONTINUED | OUTPATIENT
Start: 2023-09-21 | End: 2023-09-22 | Stop reason: HOSPADM

## 2023-09-21 RX ADMIN — LEVOTHYROXINE SODIUM 100 MCG: 100 TABLET ORAL at 05:14

## 2023-09-21 RX ADMIN — FIDAXOMICIN 200 MG: 200 TABLET, FILM COATED ORAL at 21:42

## 2023-09-21 RX ADMIN — METOPROLOL TARTRATE 25 MG: 25 TABLET, FILM COATED ORAL at 21:42

## 2023-09-21 RX ADMIN — METOPROLOL TARTRATE 25 MG: 25 TABLET, FILM COATED ORAL at 08:20

## 2023-09-21 RX ADMIN — BUPROPION HYDROCHLORIDE 150 MG: 150 TABLET, FILM COATED, EXTENDED RELEASE ORAL at 08:20

## 2023-09-21 RX ADMIN — GABAPENTIN 200 MG: 100 CAPSULE ORAL at 12:50

## 2023-09-21 RX ADMIN — SODIUM CHLORIDE: 9 INJECTION, SOLUTION INTRAVENOUS at 00:08

## 2023-09-21 RX ADMIN — ENOXAPARIN SODIUM 40 MG: 100 INJECTION SUBCUTANEOUS at 08:21

## 2023-09-21 RX ADMIN — DULOXETINE HYDROCHLORIDE 120 MG: 60 CAPSULE, DELAYED RELEASE ORAL at 08:27

## 2023-09-21 RX ADMIN — GABAPENTIN 200 MG: 100 CAPSULE ORAL at 21:42

## 2023-09-21 RX ADMIN — SODIUM CHLORIDE: 9 INJECTION, SOLUTION INTRAVENOUS at 19:39

## 2023-09-21 RX ADMIN — DILTIAZEM HYDROCHLORIDE 360 MG: 120 CAPSULE, COATED, EXTENDED RELEASE ORAL at 08:21

## 2023-09-21 RX ADMIN — FIDAXOMICIN 200 MG: 200 TABLET, FILM COATED ORAL at 08:24

## 2023-09-21 RX ADMIN — LAMOTRIGINE 200 MG: 100 TABLET ORAL at 08:32

## 2023-09-21 RX ADMIN — SODIUM CHLORIDE: 9 INJECTION, SOLUTION INTRAVENOUS at 10:22

## 2023-09-21 RX ADMIN — SODIUM CHLORIDE, PRESERVATIVE FREE 10 ML: 5 INJECTION INTRAVENOUS at 21:46

## 2023-09-21 NOTE — PROGRESS NOTES
V2.0    [facility] Progress Note      Name:  Surya Rubio /Age/Sex: 1960  (58 y.o. female)   MRN & CSN:  [id number] & [id number] Encounter Date/Time: 2023 2:54 PM EDT   Location:  [facility] PCP: MD Jose Armando Eldridge MD       Hospital Day: 3    Assessment and Recommendations   Surya Rubio is a 58 y.o. female with pmh of prediabetic state, hypothyroidism, chronic low back pain, irritable bowel syndrome and anxiety disorder who presents with nausea, vomiting and diarrhea for 10 days. Stool for C. difficile toxin is positive. Creatinine 1.3 on admission. 1.  C. difficile diarrhea: Patient is doing well with Dificid. Antiemetic. Advance diet as tolerated. Monitor chemistry daily. 2.  Acute kidney injury: Due to GI fluid loss. Hold HCTZ, Mobic and metformin. Continue IV fluid hydration. Monitor chemistry daily-BMP is still pending. .    3.  Hypomagnesemia/hypokalemia: Due to GI loss. Replace and monitor. 4.  Normocytic anemia: No obvious bleeding. Monitor CBC and send anemia work-up. 5.  Prediabetic state: Hold metformin for now. Monitor glucose and cover with insulin sliding scale. Current problems include hypothyroidism, chronic lower back pain, irritable bowel syndrome and anxiety disorder. Diet ADULT DIET; Easy to Chew   DVT Prophylaxis [x] Lovenox, []  Heparin, [] SCDs, [] Ambulation,  [] Eliquis, [] Xarelto  [] Coumadin   Code Status Full Code   Disposition From: She lives with her . Expected Disposition: Home  Estimated Date of Discharge: 1 to 2 days. Patient requires continued admission due to C. difficile diarrhea. Surrogate Decision Maker/ POA       Personally reviewed Lab Studies and Imaging             Subjective:     Chief Complaint:     Diarrhea is improving. She had 2 episodes yesterday. None today so far. No abdominal pain.       Review of Systems:      Pertinent positives and negatives discussed in 09/20/23  0934   WBC 13.2* 10.3   HGB 9.9* 9.6*    261       BMP:    Recent Labs     09/19/23 2042 09/20/23  0934   * 137   K 3.5 3.1*   CL 96* 99   CO2 25 25   BUN 18 14   CREATININE 1.3* 1.2   GLUCOSE 110* 131*       Hepatic:   Recent Labs     09/19/23 2042   AST 8*   ALT <5*   BILITOT <0.2   ALKPHOS 67       Lipids:   Lab Results   Component Value Date/Time    CHOL 218 04/18/2023 12:17 PM    HDL 49 04/18/2023 12:17 PM    HDL 49 12/28/2011 09:54 AM    TRIG 172 04/18/2023 12:17 PM     Hemoglobin A1C:   Lab Results   Component Value Date/Time    LABA1C 4.9 09/19/2023 08:18 PM     TSH:   Lab Results   Component Value Date/Time    TSH 1.01 04/18/2023 12:17 PM       Lactic Acid:   Recent Labs     09/20/23  0934   LACTA 2.7*         UA:  Lab Results   Component Value Date/Time    NITRU Negative 09/19/2023 09:49 PM    COLORU Yellow 09/19/2023 09:49 PM    PHUR 6.0 09/19/2023 11:41 PM    WBCUA 6-9 09/19/2023 09:49 PM    RBCUA 3-4 09/19/2023 09:49 PM    MUCUS Rare 09/19/2023 09:49 PM    BACTERIA 1+ 09/19/2023 09:49 PM    CLARITYU Clear 09/19/2023 09:49 PM    SPECGRAV 1.015 09/19/2023 09:49 PM    LEUKOCYTESUR SMALL 09/19/2023 09:49 PM    UROBILINOGEN 0.2 09/19/2023 09:49 PM    BILIRUBINUR Negative 09/19/2023 09:49 PM    BILIRUBINUR negative 10/01/2020 04:04 PM    BILIRUBINUR NEGATIVE 08/19/2010 01:10 PM    BLOODU Negative 09/19/2023 09:49 PM    GLUCOSEU Negative 09/19/2023 09:49 PM    GLUCOSEU NEGATIVE 08/19/2010 01:10 PM    KETUA Negative 09/19/2023 09:49 PM    AMORPHOUS Rare 09/19/2023 09:49 PM     Urine Cultures:   Lab Results   Component Value Date/Time    LABURIN 25,000 CFU/ml 09/19/2023 09:49 PM       Organism:   Lab Results   Component Value Date/Time    ORG Enterococcus faecium 09/19/2023 09:49 PM         Electronically signed by Linda Oneal MD on 9/21/2023 at 4:18 PM

## 2023-09-21 NOTE — PROGRESS NOTES
Patient resting comfortably in bed at the time of head-to-toe shift assessment. A/O x 4. VSS. Denies pain, nausea, and vomiting. Endorses diarrhea. Patient is ambulating independently with  at the bedside. Bed is in the lowest position and call light is within reach. Medication pass competed with this nursing student and Lyla Luciano RN.

## 2023-09-21 NOTE — PLAN OF CARE
Problem: Pain  Goal: Verbalizes/displays adequate comfort level or baseline comfort level  Outcome: Progressing  Flowsheets (Taken 9/21/2023 1036)  Verbalizes/displays adequate comfort level or baseline comfort level:   Encourage patient to monitor pain and request assistance   Assess pain using appropriate pain scale   Administer analgesics based on type and severity of pain and evaluate response     Problem: Discharge Planning  Goal: Discharge to home or other facility with appropriate resources  9/21/2023 1036 by Scottie Morton RN  Outcome: Progressing  Flowsheets (Taken 9/21/2023 1036)  Discharge to home or other facility with appropriate resources:   Identify barriers to discharge with patient and caregiver   Arrange for needed discharge resources and transportation as appropriate   Identify discharge learning needs (meds, wound care, etc)

## 2023-09-21 NOTE — PLAN OF CARE
Problem: Discharge Planning  Goal: Discharge to home or other facility with appropriate resources  Outcome: Progressing     Problem: Safety - Adult  Goal: Free from fall injury  9/20/2023 1146 by Mel Smyth  Outcome: Adequate for Discharge  Note: Pt is a low fall risk and up ab anthony. Will continue to implement safety precautions.

## 2023-09-22 VITALS
OXYGEN SATURATION: 96 % | DIASTOLIC BLOOD PRESSURE: 77 MMHG | BODY MASS INDEX: 27.81 KG/M2 | HEIGHT: 63 IN | HEART RATE: 70 BPM | SYSTOLIC BLOOD PRESSURE: 118 MMHG | TEMPERATURE: 98.6 F | RESPIRATION RATE: 19 BRPM

## 2023-09-22 LAB
ANION GAP SERPL CALCULATED.3IONS-SCNC: 10 MMOL/L (ref 3–16)
BUN SERPL-MCNC: 8 MG/DL (ref 7–20)
CALCIUM SERPL-MCNC: 8.9 MG/DL (ref 8.3–10.6)
CHLORIDE SERPL-SCNC: 105 MMOL/L (ref 99–110)
CO2 SERPL-SCNC: 25 MMOL/L (ref 21–32)
CREAT SERPL-MCNC: 0.9 MG/DL (ref 0.6–1.2)
GFR SERPLBLD CREATININE-BSD FMLA CKD-EPI: >60 ML/MIN/{1.73_M2}
GLUCOSE BLD-MCNC: 113 MG/DL (ref 70–99)
GLUCOSE BLD-MCNC: 88 MG/DL (ref 70–99)
GLUCOSE SERPL-MCNC: 119 MG/DL (ref 70–99)
MAGNESIUM SERPL-MCNC: 1.3 MG/DL (ref 1.8–2.4)
PERFORMED ON: ABNORMAL
PERFORMED ON: NORMAL
POTASSIUM SERPL-SCNC: 3.5 MMOL/L (ref 3.5–5.1)
SODIUM SERPL-SCNC: 140 MMOL/L (ref 136–145)

## 2023-09-22 PROCEDURE — 6370000000 HC RX 637 (ALT 250 FOR IP): Performed by: INTERNAL MEDICINE

## 2023-09-22 PROCEDURE — 6360000002 HC RX W HCPCS: Performed by: INTERNAL MEDICINE

## 2023-09-22 PROCEDURE — 80048 BASIC METABOLIC PNL TOTAL CA: CPT

## 2023-09-22 PROCEDURE — 6370000000 HC RX 637 (ALT 250 FOR IP): Performed by: NURSE PRACTITIONER

## 2023-09-22 PROCEDURE — 36415 COLL VENOUS BLD VENIPUNCTURE: CPT

## 2023-09-22 PROCEDURE — 2580000003 HC RX 258: Performed by: INTERNAL MEDICINE

## 2023-09-22 PROCEDURE — 6360000002 HC RX W HCPCS

## 2023-09-22 PROCEDURE — 83735 ASSAY OF MAGNESIUM: CPT

## 2023-09-22 RX ORDER — MAGNESIUM SULFATE IN WATER 40 MG/ML
2000 INJECTION, SOLUTION INTRAVENOUS ONCE
Status: COMPLETED | OUTPATIENT
Start: 2023-09-22 | End: 2023-09-22

## 2023-09-22 RX ORDER — POTASSIUM CHLORIDE 20 MEQ/1
40 TABLET, EXTENDED RELEASE ORAL ONCE
Status: COMPLETED | OUTPATIENT
Start: 2023-09-22 | End: 2023-09-22

## 2023-09-22 RX ORDER — MAGNESIUM OXIDE 400 MG/1
400 TABLET ORAL 2 TIMES DAILY
Qty: 10 TABLET | Refills: 0 | Status: SHIPPED | OUTPATIENT
Start: 2023-09-22 | End: 2023-09-27

## 2023-09-22 RX ADMIN — DULOXETINE HYDROCHLORIDE 120 MG: 60 CAPSULE, DELAYED RELEASE ORAL at 09:33

## 2023-09-22 RX ADMIN — SODIUM CHLORIDE: 9 INJECTION, SOLUTION INTRAVENOUS at 12:02

## 2023-09-22 RX ADMIN — SODIUM CHLORIDE: 9 INJECTION, SOLUTION INTRAVENOUS at 05:49

## 2023-09-22 RX ADMIN — LAMOTRIGINE 200 MG: 100 TABLET ORAL at 09:48

## 2023-09-22 RX ADMIN — FIDAXOMICIN 200 MG: 200 TABLET, FILM COATED ORAL at 09:35

## 2023-09-22 RX ADMIN — METOPROLOL TARTRATE 25 MG: 25 TABLET, FILM COATED ORAL at 09:33

## 2023-09-22 RX ADMIN — LEVOTHYROXINE SODIUM 100 MCG: 100 TABLET ORAL at 05:43

## 2023-09-22 RX ADMIN — MAGNESIUM SULFATE HEPTAHYDRATE 2000 MG: 40 INJECTION, SOLUTION INTRAVENOUS at 12:03

## 2023-09-22 RX ADMIN — BUPROPION HYDROCHLORIDE 150 MG: 150 TABLET, FILM COATED, EXTENDED RELEASE ORAL at 09:33

## 2023-09-22 RX ADMIN — DILTIAZEM HYDROCHLORIDE 360 MG: 120 CAPSULE, COATED, EXTENDED RELEASE ORAL at 09:34

## 2023-09-22 RX ADMIN — MAGNESIUM SULFATE HEPTAHYDRATE 2000 MG: 40 INJECTION, SOLUTION INTRAVENOUS at 14:10

## 2023-09-22 RX ADMIN — GABAPENTIN 200 MG: 100 CAPSULE ORAL at 09:34

## 2023-09-22 RX ADMIN — POTASSIUM CHLORIDE 40 MEQ: 1500 TABLET, EXTENDED RELEASE ORAL at 11:57

## 2023-09-22 ASSESSMENT — PAIN SCALES - GENERAL: PAINLEVEL_OUTOF10: 0

## 2023-09-22 NOTE — PROGRESS NOTES
Physician Progress Note      Jory Peña  [facility] #:                  [id number]  :                       1960  ADMIT DATE:       2023 7:38 PM  [facility] DATE:        2023 5:15 PM  RESPONDING  PROVIDER #:        Tera Melo MD          QUERY TEXT:    Patient admitted  with C-diff. Noted documentation of Acute Kidney Injury   in H&P. In order to support the diagnosis of JERALD, please include additional   clinical indicators in your documentation. ? Or please document if the   diagnosis of JERALD has been ruled out after further study. The medical record reflects the following:  Risk Factors:  58 y.o. female with hx of HTN, IBS, Hypothyroidism, Lumbar   radiculopathy, Anxiety  Clinical Indicators: Baseline creatinine 1.0. Presented with creatinine of   1.3. Following creatinine results of 1.2 & 0.9. Treatment: Monitor labs    Defined by Kidney Disease Improving Global Outcomes (KDIGO) clinical practice   guideline for acute kidney injury:  -Increase in SCr by greater than or equal to 0.3 mg/dl within 48 hours; or  -Increase or decrease in SCr to greater than or equal to 1.5 times baseline,   which is known or presumed to have occurred within the prior 7 days; or  -Urine volume < 0.5ml/kg/h for 6 hours. Options provided:  -- Acute kidney injury ruled out after study  -- Acute kidney injury evidenced by, Please document evidence. -- Other - I will add my own diagnosis  -- Disagree - Not applicable / Not valid  -- Disagree - Clinically unable to determine / Unknown  -- Refer to Clinical Documentation Reviewer    PROVIDER RESPONSE TEXT:    Acute kidney injury was ruled out after study.     Query created by: Marcello Turner on 2023 4:59 PM      Electronically signed by:  Tera Melo MD 2023 6:50 PM

## 2023-09-22 NOTE — PLAN OF CARE
Problem: Safety - Adult  Goal: Free from fall injury  Outcome: Progressing:  Remains free from falls. Problem: Pain  Goal: Verbalizes/displays adequate comfort level or baseline comfort level  Outcome: Progressing:  Denies pain/needs. Resting in bed, watching TV. Patient's spouse is by her bedside and assists patient with her needs per her request.  Bed in low position, call light in reach, will continue to monitor.

## 2023-09-22 NOTE — DISCHARGE SUMMARY
V2.0  Discharge Summary    Name:  Ava Latham /Age/Sex: 1960 (76 y.o. female)   Admit Date: 2023  Discharge Date: 23    MRN & CSN:  [id number] & [id number] Encounter Date and Time 23 1:29 PM EDT    Attending:  Linda Oneal MD Discharging Provider: Linda Oneal MD       Hospital Course:     Brief HPI: Ava Latham is a 58 y.o. female who presented with vomiting and diarrhea for 10 days. Brief Problem Based Course:   Ava Latham is a 58 y.o. female with a pmh of chronic low back pain, IBS, anxiety disorder who presents with JERALD (acute kidney injury) ([address]) secondary to nausea, vomiting and watery diarrhea for the past 10 days. Patient denies any abdominal pain, fevers, chills, melena, hematochezia or hematemesis. Of significance, she has been exposed to her son who is being treated for C. difficile colitis. She does feel lightheaded but no syncope. Admitted as:  Ava Latham is a 58 y.o. female with pmh of prediabetic state, hypothyroidism, chronic low back pain, irritable bowel syndrome and anxiety disorder who presents with nausea, vomiting and diarrhea for 10 days. Stool for C. difficile toxin is positive. Creatinine 1.3 on admission. 1.  C. difficile diarrhea: She responded to Dificid. Diet advanced as tolerated. Discharged on Dificid to complete 10 days course. 2.  Acute kidney injury: Due to GI fluid loss. Responded to IV fluid hydration. Resumed HCTZ and metformin at discharge. 3.  Hypomagnesemia/hypokalemia: Due to GI loss. Replaced. Discharged on magnesium oxide for few days. 4.  Normocytic anemia: No obvious bleeding. Low iron and TIBC suggest anemia of chronic disease. 5.  Prediabetic state: Resumed metformin at discharge. Monitored glucose and covered with insulin sliding scale during admission.       The patient expressed appropriate understanding of, and agreement with the discharge recommendations, medications, and plan. On the day of discharge:  at the bedside. No diarrhea yesterday and today. She is tolerating oral diet.     Consults this admission:  None    Discharge Diagnosis:   JERALD (acute kidney injury) ([address])    C. difficile diarrhea  Electrolytes abnormality  Normocytic anemia    Discharge Instruction:   Follow up appointments: PCP  Primary care physician: Rahul Romano MD within 2 weeks  Diet: regular diet   Activity: activity as tolerated  Disposition: Discharged to:   [x]Home, [][facility], []SNF, []Acute Rehab, []Hospice   Condition on discharge: Stable  Labs and Tests to be Followed up as an outpatient by PCP or Specialist: None    Discharge Medications:        Medication List        START taking these medications      Fidaxomicin 200 MG Tabs tablet  Commonly known as: DIFICID  Take 200 mg by mouth 2 times daily for 15 doses     magnesium oxide 400 MG tablet  Commonly known as: MAG-OX  Take 1 tablet by mouth 2 times daily for 5 days            CONTINUE taking these medications      buPROPion 300 MG extended release tablet  Commonly known as: WELLBUTRIN XL     dilTIAZem 360 MG extended release capsule  Commonly known as: CARDIZEM CD  TAKE 1 CAPSULE BY MOUTH DAILY     DULoxetine 60 MG extended release capsule  Commonly known as: CYMBALTA     gabapentin 300 MG capsule  Commonly known as: NEURONTIN     hydroCHLOROthiazide 50 MG tablet  Commonly known as: HYDRODIURIL  TAKE 1 TABLET BY MOUTH DAILY     lamoTRIgine 200 MG tablet  Commonly known as: LAMICTAL     levothyroxine 100 MCG tablet  Commonly known as: SYNTHROID  TAKE 1 TABLET BY MOUTH DAILY     metFORMIN 500 MG extended release tablet  Commonly known as: GLUCOPHAGE-XR     metoprolol tartrate 25 MG tablet  Commonly known as: LOPRESSOR  TAKE 1 TABLET BY MOUTH TWICE DAILY     ondansetron 8 MG Tbdp disintegrating tablet  Commonly known as: ZOFRAN-ODT  DISSOLVE 1 TABLET UNDER THE TONGUE EVERY 8 HOURS AS NEEDED FOR NAUSEA OR VOMITING     Rexulti 2 MG Tabs

## 2023-09-22 NOTE — PROGRESS NOTES
Patient discharged to home with spouse, discharge instructions reviewed, patient verbalized understanding. IV removed, belongings packed and taken at time of discharge.

## 2023-09-22 NOTE — PLAN OF CARE
Problem: Safety - Adult  Goal: Free from fall injury  9/22/2023 1409 by Caroline Dai RN  Outcome: Adequate for Discharge  9/22/2023 5184 by Atlhea Montgomery RN  Outcome: Progressing     Problem: Pain  Goal: Verbalizes/displays adequate comfort level or baseline comfort level  9/22/2023 1409 by Caroline Dai RN  Outcome: Adequate for Discharge  9/22/2023 8565 by Althea Montgomery RN  Outcome: Progressing     Problem: Skin/Tissue Integrity  Goal: Absence of new skin breakdown  Description: 1. Monitor for areas of redness and/or skin breakdown  2. Assess vascular access sites hourly  3. Every 4-6 hours minimum:  Change oxygen saturation probe site  4. Every 4-6 hours:  If on nasal continuous positive airway pressure, respiratory therapy assess nares and determine need for appliance change or resting period.   Outcome: Adequate for Discharge     Problem: Discharge Planning  Goal: Discharge to home or other facility with appropriate resources  Outcome: Adequate for Discharge

## 2023-09-22 NOTE — PROGRESS NOTES
Patient refused to have spouse leave. Dawood Rodriguez RN Notified Supervisor, Vanessa Gannon, and Unit Manager Suzi Mckinley, who approved patient's spouse to stay. Patient threatened to leave hospital against medical advise if her spouse could not stay. Patient refused bed alarm and staff assistance, and only wants her  to help her. Educated patient/spouse on the risk for C. Diff exposure, and patient/spouse aware of risk.

## 2023-09-22 NOTE — CARE COORDINATION
[E86.0]  Hypomagnesemia [E83.42]  JERALD (acute kidney injury) ([address]) [N17.9]  Nausea and vomiting, unspecified vomiting type [R11.2]    The Patient and/or patient representative Ramakrishna Frankel and her family were provided with a choice of provider and agrees with the discharge plan Yes    Freedom of choice list was provided with basic dialogue that supports the patient's individualized plan of care/goals and shares the quality data associated with the providers.  Yes    Care Transitions patient: No    Eddie Quesada RN  [facility] [id number]  Case Management Department  Ph: [phone]

## 2023-10-02 ENCOUNTER — OFFICE VISIT (OUTPATIENT)
Dept: FAMILY MEDICINE CLINIC | Age: 63
End: 2023-10-02
Payer: COMMERCIAL

## 2023-10-02 VITALS
HEIGHT: 63 IN | TEMPERATURE: 98.1 F | DIASTOLIC BLOOD PRESSURE: 68 MMHG | HEART RATE: 76 BPM | OXYGEN SATURATION: 97 % | WEIGHT: 154 LBS | SYSTOLIC BLOOD PRESSURE: 102 MMHG | BODY MASS INDEX: 27.29 KG/M2

## 2023-10-02 DIAGNOSIS — D64.9 ANEMIA, UNSPECIFIED TYPE: Primary | ICD-10-CM

## 2023-10-02 DIAGNOSIS — E87.6 HYPOKALEMIA: ICD-10-CM

## 2023-10-02 PROCEDURE — 3074F SYST BP LT 130 MM HG: CPT | Performed by: FAMILY MEDICINE

## 2023-10-02 PROCEDURE — 3078F DIAST BP <80 MM HG: CPT | Performed by: FAMILY MEDICINE

## 2023-10-02 PROCEDURE — 99213 OFFICE O/P EST LOW 20 MIN: CPT | Performed by: FAMILY MEDICINE

## 2023-10-02 RX ORDER — SEMAGLUTIDE 2.4 MG/.75ML
2.4 INJECTION, SOLUTION SUBCUTANEOUS
Qty: 3 ML | Refills: 5 | Status: SHIPPED | OUTPATIENT
Start: 2023-10-02

## 2023-10-02 RX ORDER — GABAPENTIN 600 MG/1
600 TABLET ORAL DAILY
COMMUNITY
Start: 2023-09-30

## 2023-10-02 SDOH — ECONOMIC STABILITY: FOOD INSECURITY: WITHIN THE PAST 12 MONTHS, THE FOOD YOU BOUGHT JUST DIDN'T LAST AND YOU DIDN'T HAVE MONEY TO GET MORE.: NEVER TRUE

## 2023-10-02 SDOH — ECONOMIC STABILITY: HOUSING INSECURITY
IN THE LAST 12 MONTHS, WAS THERE A TIME WHEN YOU DID NOT HAVE A STEADY PLACE TO SLEEP OR SLEPT IN A SHELTER (INCLUDING NOW)?: NO

## 2023-10-02 SDOH — ECONOMIC STABILITY: INCOME INSECURITY: HOW HARD IS IT FOR YOU TO PAY FOR THE VERY BASICS LIKE FOOD, HOUSING, MEDICAL CARE, AND HEATING?: NOT HARD AT ALL

## 2023-10-02 SDOH — ECONOMIC STABILITY: FOOD INSECURITY: WITHIN THE PAST 12 MONTHS, YOU WORRIED THAT YOUR FOOD WOULD RUN OUT BEFORE YOU GOT MONEY TO BUY MORE.: NEVER TRUE

## 2023-10-03 DIAGNOSIS — E87.6 HYPOKALEMIA: ICD-10-CM

## 2023-10-03 DIAGNOSIS — D64.9 ANEMIA, UNSPECIFIED TYPE: ICD-10-CM

## 2023-10-03 LAB
ANION GAP SERPL CALCULATED.3IONS-SCNC: 11 MMOL/L (ref 3–16)
BUN SERPL-MCNC: 21 MG/DL (ref 7–20)
CALCIUM SERPL-MCNC: 9.5 MG/DL (ref 8.3–10.6)
CHLORIDE SERPL-SCNC: 99 MMOL/L (ref 99–110)
CO2 SERPL-SCNC: 27 MMOL/L (ref 21–32)
CREAT SERPL-MCNC: 1.2 MG/DL (ref 0.6–1.2)
DEPRECATED RDW RBC AUTO: 13.6 % (ref 12.4–15.4)
FERRITIN SERPL IA-MCNC: 116.3 NG/ML (ref 15–150)
GFR SERPLBLD CREATININE-BSD FMLA CKD-EPI: 50 ML/MIN/{1.73_M2}
GLUCOSE SERPL-MCNC: 84 MG/DL (ref 70–99)
HCT VFR BLD AUTO: 31.3 % (ref 36–48)
HGB BLD-MCNC: 10.7 G/DL (ref 12–16)
MAGNESIUM SERPL-MCNC: 1.6 MG/DL (ref 1.8–2.4)
MCH RBC QN AUTO: 31.5 PG (ref 26–34)
MCHC RBC AUTO-ENTMCNC: 34.1 G/DL (ref 31–36)
MCV RBC AUTO: 92.4 FL (ref 80–100)
PLATELET # BLD AUTO: 366 K/UL (ref 135–450)
PMV BLD AUTO: 8.1 FL (ref 5–10.5)
POTASSIUM SERPL-SCNC: 4.9 MMOL/L (ref 3.5–5.1)
RBC # BLD AUTO: 3.39 M/UL (ref 4–5.2)
SODIUM SERPL-SCNC: 137 MMOL/L (ref 136–145)
WBC # BLD AUTO: 10.1 K/UL (ref 4–11)

## 2023-10-04 ENCOUNTER — TELEPHONE (OUTPATIENT)
Dept: FAMILY MEDICINE CLINIC | Age: 63
End: 2023-10-04

## 2023-10-04 DIAGNOSIS — R19.7 DIARRHEA, UNSPECIFIED TYPE: Primary | ICD-10-CM

## 2023-10-04 DIAGNOSIS — N18.31 STAGE 3A CHRONIC KIDNEY DISEASE (HCC): ICD-10-CM

## 2023-10-04 NOTE — TELEPHONE ENCOUNTER
Anemia mild and stable from the hospital  Magnesium low so would recommend supplement for a month to make sure levels restored  I can send in mag ox 400 mg if she doesn't have a mag supplement. Kidney function a little reduced. Would be a good idea to see nephrology.  I can order if that's ok

## 2023-10-04 NOTE — TELEPHONE ENCOUNTER
PT called back, she is concerned about her labs. She said the results are bad and she really wants a call back today about them.

## 2023-10-05 RX ORDER — MAGNESIUM OXIDE 400 MG/1
400 TABLET ORAL DAILY
Qty: 30 TABLET | Refills: 1 | Status: SHIPPED | OUTPATIENT
Start: 2023-10-05

## 2023-10-18 RX ORDER — ONDANSETRON 8 MG/1
TABLET, ORALLY DISINTEGRATING ORAL
Qty: 30 TABLET | Refills: 2 | Status: SHIPPED | OUTPATIENT
Start: 2023-10-18

## 2023-10-19 RX ORDER — CHOLESTYRAMINE 4 G/9G
1 POWDER, FOR SUSPENSION ORAL DAILY
Qty: 30 PACKET | Refills: 3 | Status: SHIPPED | OUTPATIENT
Start: 2023-10-19

## 2023-11-15 ENCOUNTER — TELEPHONE (OUTPATIENT)
Dept: FAMILY MEDICINE CLINIC | Age: 63
End: 2023-11-15

## 2023-11-15 ENCOUNTER — TELEPHONE (OUTPATIENT)
Dept: ADMINISTRATIVE | Age: 63
End: 2023-11-15

## 2023-11-15 DIAGNOSIS — K90.89 BILE SALT-INDUCED DIARRHEA: ICD-10-CM

## 2023-11-15 DIAGNOSIS — K90.89 BILE SALT-INDUCED DIARRHEA: Primary | ICD-10-CM

## 2023-11-15 RX ORDER — CHOLESTYRAMINE 4 G/9G
2 POWDER, FOR SUSPENSION ORAL DAILY
Qty: 60 PACKET | Refills: 3 | Status: SHIPPED | OUTPATIENT
Start: 2023-11-15 | End: 2023-11-15 | Stop reason: SDUPTHER

## 2023-11-15 RX ORDER — CHOLESTYRAMINE 4 G/9G
2 POWDER, FOR SUSPENSION ORAL DAILY
Qty: 60 PACKET | Refills: 5 | Status: SHIPPED | OUTPATIENT
Start: 2023-11-15

## 2023-11-15 NOTE — TELEPHONE ENCOUNTER
Received a PA request for Cholestyramine 4GM packets. Is patient taking this medication once or twice a day? What is the Dx/ICD-10 code? Please respond to the pool ( P [phone] Schroeder). Thank you!

## 2023-11-15 NOTE — TELEPHONE ENCOUNTER
Submitted PA for Cholestyramine 4GM packets  Via [facility] Key: [id number] STATUS: PENDING. Follow up done daily; if no response in three days we will refax for status check. If another three days goes by with no response we will call the insurance for status.

## 2023-11-15 NOTE — TELEPHONE ENCOUNTER
Pts  called regarding the Cholestyrmine (Questran) 4g packet .  He states his wife was advised to take it BID, and her script is written for q.d.    He is requesting an updated script for BID sent to a New pharmacy  [facility]  [address], [place] [address]  n [phone]

## 2023-11-20 RX ORDER — ONDANSETRON 8 MG/1
TABLET, ORALLY DISINTEGRATING ORAL
Qty: 9 TABLET | Refills: 0 | Status: SHIPPED | OUTPATIENT
Start: 2023-11-20

## 2023-11-20 NOTE — TELEPHONE ENCOUNTER
Medication:   Requested Prescriptions     Pending Prescriptions Disp Refills    ondansetron (ZOFRAN-ODT) 8 MG TBDP disintegrating tablet [Pharmacy Med Name: Ondansetron 8 MG Oral Tablet Disintegrating] 9 tablet 0     Sig: DISSOLVE 1 TABLET BY MOUTH EVERY 8 HOURS AS NEEDED FOR NAUSEA AND FOR VOMITING        Last Filled:      Patient Phone Number: [phone] (home)     Last appt: 10/2/2023   Next appt: Visit date not found    Last OARRS:        No data to display

## 2023-11-29 ENCOUNTER — TELEPHONE (OUTPATIENT)
Dept: FAMILY MEDICINE CLINIC | Age: 63
End: 2023-11-29

## 2023-11-29 RX ORDER — SEMAGLUTIDE 2.4 MG/.75ML
2.4 INJECTION, SOLUTION SUBCUTANEOUS
Qty: 3 ML | Refills: 5 | Status: SHIPPED | OUTPATIENT
Start: 2023-11-29

## 2023-11-29 NOTE — TELEPHONE ENCOUNTER
Medication:   Requested Prescriptions     Pending Prescriptions Disp Refills    Semaglutide-Weight Management (WEGOVY) 2.4 MG/0.75ML SOAJ SC injection 3 mL 5     Sig: Inject 2.4 mg into the skin every 7 days       Last Filled:  10/02/2023    Patient Phone Number: [phone] (home)     Last appt: 10/2/2023   Next appt: Visit date not found    Last Labs DM:   Lab Results   Component Value Date/Time    LABA1C 4.9 09/19/2023 08:18 PM

## 2023-11-30 NOTE — TELEPHONE ENCOUNTER
Submitted PA for Wegovy 2.4MG/0.75ML auto-injectors  Via CMM Key: [id number] STATUS: PENDING.    Follow up done daily; if no response in three days we will refax for status check.  If another three days goes by with no response we will call the insurance for status.

## 2023-11-30 NOTE — TELEPHONE ENCOUNTER
Patient's spouse called in requesting refill for Ondansetron 8mg [facility] [address] [facility] [address] [place] [address]

## 2023-12-01 RX ORDER — ONDANSETRON 8 MG/1
TABLET, ORALLY DISINTEGRATING ORAL
Qty: 9 TABLET | Refills: 0 | Status: SHIPPED | OUTPATIENT
Start: 2023-12-01

## 2023-12-04 NOTE — TELEPHONE ENCOUNTER
DENIAL for Wegovy 2.4MG/0.75ML auto-injectors - weight loss treatments are not covered by patient's plan; letter attached.    If this requires a response please respond to the pool ( P MHCX PSC MEDICATION PRE-AUTH).      Thank you please advise patient.

## 2023-12-11 RX ORDER — ONDANSETRON 8 MG/1
TABLET, ORALLY DISINTEGRATING ORAL
Qty: 9 TABLET | Refills: 0 | Status: SHIPPED | OUTPATIENT
Start: 2023-12-11

## 2023-12-11 NOTE — TELEPHONE ENCOUNTER
Medication:   Requested Prescriptions     Pending Prescriptions Disp Refills    ondansetron (ZOFRAN-ODT) 8 MG TBDP disintegrating tablet [Pharmacy Med Name: Ondansetron 8 MG Oral Tablet Disintegrating] 9 tablet 0     Sig: DISSOLVE 1 TABLET IN MOUTH EVERY 8 HOURS AS NEEDED FOR NAUSEA AND FOR VOMITING        Last Filled:  12/01/2023    Patient Phone Number: [phone] (home)     Last appt: 10/2/2023   Next appt: Visit date not found    Last OARRS:        No data to display

## 2023-12-28 RX ORDER — DILTIAZEM HYDROCHLORIDE 360 MG/1
360 CAPSULE, EXTENDED RELEASE ORAL DAILY
Qty: 90 CAPSULE | Refills: 1 | Status: SHIPPED | OUTPATIENT
Start: 2023-12-28

## 2023-12-28 RX ORDER — DILTIAZEM HYDROCHLORIDE 360 MG/1
360 CAPSULE, EXTENDED RELEASE ORAL DAILY
Qty: 90 CAPSULE | Refills: 1 | OUTPATIENT
Start: 2023-12-28

## 2023-12-28 RX ORDER — DILTIAZEM HYDROCHLORIDE 360 MG/1
360 CAPSULE, EXTENDED RELEASE ORAL DAILY
Qty: 90 CAPSULE | Refills: 1 | Status: CANCELLED | OUTPATIENT
Start: 2023-12-28

## 2023-12-28 NOTE — TELEPHONE ENCOUNTER
90 day supply (if possible) if not, send a 30 day supply Diltiazem 360 mg ER refill needed. Send to [address] [facility], [address] [facility], ph. [phone]    Pt would like to know if this could be called in today, if possible.

## 2023-12-28 NOTE — TELEPHONE ENCOUNTER
Medication:   Requested Prescriptions     Pending Prescriptions Disp Refills    dilTIAZem (CARDIZEM CD) 360 MG extended release capsule 90 capsule 1     Sig: Take 1 capsule by mouth daily        Last Filled:  05/08/2023    Patient Phone Number: [phone] (home)     Last appt: 10/2/2023   Next appt: 12/28/2023    Last OARRS:        No data to display

## 2024-01-04 NOTE — TELEPHONE ENCOUNTER
Pt called to check the status of the pt's Wegovy PA.  Verified insurance with spouse and found out that the pt has a new insurance.  Pt no longer has [employer] and never gave us the new card or uploaded the new card into My Chart.      Advised spouse to upload new [employer] insurance card and contact the office so that we can put in the new card and submit PA to the correct insurance company.  Spouse said he will try to do it today.

## 2024-01-11 NOTE — TELEPHONE ENCOUNTER
Patient comment: Dr. Gil, my [facility] does not cover wegovy. I found out that it does cover Ozempic. My A1c was prediabetic until the wegovy  helped me lose weight. I need to maintain my weight now. Can you please call in a prescription of Ozempic. Thank you

## 2024-01-12 RX ORDER — SEMAGLUTIDE 2.4 MG/.75ML
2.4 INJECTION, SOLUTION SUBCUTANEOUS
Qty: 3 ML | Refills: 5 | OUTPATIENT
Start: 2024-01-12

## 2024-01-12 NOTE — TELEPHONE ENCOUNTER
Pt's spouse called requesting that a new Ozempic Rx to [facility] in [place], [place].  Pt's insurance will cover this one and not Wegovy.

## 2024-01-15 NOTE — TELEPHONE ENCOUNTER
I spoke with patient. Patient called and spoke with insurance and they stated they will cover Ozempic. Patient would like this sent in to see what insurance says.

## 2024-01-17 NOTE — TELEPHONE ENCOUNTER
Medication:   Requested Prescriptions     Pending Prescriptions Disp Refills    Semaglutide,0.25 or 0.5MG/DOS, 2 MG/1.5ML SOPN       Sig: Inject into the skin       Last Filled:  ???    Patient Phone Number: [phone] (home)     Last appt: 10/2/2023   Next appt: Visit date not found    Last Labs DM: 9/19/23  Lab Results   Component Value Date/Time    LABA1C 4.9 09/19/2023 08:18 PM

## 2024-01-17 NOTE — TELEPHONE ENCOUNTER
New Rx needed for Ozempic.  Please send to [facility] [address]  In [place].     Pt states insurance will cover.

## 2024-01-17 NOTE — TELEPHONE ENCOUNTER
Pt called requesting that new Ozempic Rx be sent to [facility] in [place].  I put in refill request, due to pt stating her insurance will cover it.

## 2024-01-19 ENCOUNTER — TELEPHONE (OUTPATIENT)
Dept: ADMINISTRATIVE | Age: 64
End: 2024-01-19

## 2024-01-19 NOTE — TELEPHONE ENCOUNTER
Submitted PA for Ozempic (2 MG/DOSE) 8MG/3ML pen-injectors  Via CMM Key: [id number]) STATUS: NOT SENT    DX and ICD10 Code needed to proceed with PA    If this requires a response please respond to the pool ( P MHCX PSC MEDICATION PRE-AUTH).      Thank you please advise patient.

## 2024-01-22 NOTE — TELEPHONE ENCOUNTER
ICD10 codes are needed to proceed.     If this requires a response please respond to the pool ( P MHCX PSC MEDICATION PRE-AUTH).      Thank you please advise patient.

## 2024-01-23 NOTE — TELEPHONE ENCOUNTER
Submitted PA for Ozempic (2 MG/DOSE) 8MG/3ML pen-injectors  Via CMM (Key: [id number]) STATUS: PENDING.    Follow up done daily; if no decision with in three days we will refax.  If another three days goes by with no decision will call the insurance for status.

## 2024-01-24 NOTE — TELEPHONE ENCOUNTER
DENIAL for Ozempic (2 MG/DOSE) 8MG/3ML pen-injectors; letter attached.    If this requires a response please respond to the pool ( P MHCX PSC MEDICATION PRE-AUTH).      Thank you please advise patient.

## 2024-01-30 NOTE — TELEPHONE ENCOUNTER
Spouse found a compounding Pharmacy and all they need is an RX from Dr. Gil for the Ozempic or Wegovy.     [facility] [phone]

## 2024-02-13 NOTE — TELEPHONE ENCOUNTER
Medication:   Requested Prescriptions     Pending Prescriptions Disp Refills    metoprolol tartrate (LOPRESSOR) 25 MG tablet [Pharmacy Med Name: Metoprolol Tartrate 25 MG Oral Tablet] 180 tablet 0     Sig: Take 1 tablet by mouth twice daily        Last Filled:    8/17/23  Patient Phone Number: [phone] (home)     Last appt: 10/2/2023   Next appt: Visit date not found    Last OARRS:        No data to display

## 2024-02-22 NOTE — TELEPHONE ENCOUNTER
Medication:   Requested Prescriptions     Pending Prescriptions Disp Refills    ondansetron (ZOFRAN-ODT) 8 MG TBDP disintegrating tablet [Pharmacy Med Name: Ondansetron 8 MG Oral Tablet Disintegrating] 9 tablet 0     Sig: DISSOLVE 1 TABLET IN MOUTH EVERY 8 HOURS AS NEEDED FOR NAUSEA AND FOR VOMITING        Last Filled:  12/18/23    Patient Phone Number: [phone] (home)     Last appt: 10/2/2023   Next appt: Visit date not found    Last OARRS:        No data to display

## 2024-02-23 RX ORDER — ONDANSETRON 8 MG/1
TABLET, ORALLY DISINTEGRATING ORAL
Qty: 9 TABLET | Refills: 0 | Status: SHIPPED | OUTPATIENT
Start: 2024-02-23

## 2024-03-12 NOTE — TELEPHONE ENCOUNTER
Medication:   Requested Prescriptions     Pending Prescriptions Disp Refills    levothyroxine (SYNTHROID) 100 MCG tablet 90 tablet 1     Sig: Take 1 tablet by mouth daily        Last Filled:  8/14/23    Patient Phone Number: [phone] (home)     Last appt: 10/2/2023   Next appt: Visit date not found    Last OARRS:        No data to display

## 2024-03-13 RX ORDER — LEVOTHYROXINE SODIUM 0.1 MG/1
100 TABLET ORAL DAILY
Qty: 90 TABLET | Refills: 1 | Status: SHIPPED | OUTPATIENT
Start: 2024-03-13

## 2024-03-15 RX ORDER — ONDANSETRON 8 MG/1
TABLET, ORALLY DISINTEGRATING ORAL
Qty: 9 TABLET | Refills: 0 | Status: SHIPPED | OUTPATIENT
Start: 2024-03-15

## 2024-03-15 NOTE — TELEPHONE ENCOUNTER
Medication:   Requested Prescriptions     Pending Prescriptions Disp Refills    ondansetron (ZOFRAN-ODT) 8 MG TBDP disintegrating tablet [Pharmacy Med Name: Ondansetron 8 MG Oral Tablet Disintegrating] 9 tablet 0     Sig: DISSOLVE 1 TABLET IN MOUTH EVERY 8 HOURS AS NEEDED FOR NAUSEA AND FOR VOMITING        Last Filled:  02/23/2024    Patient Phone Number: [phone] (home)     Last appt: 10/2/2023   Next appt: Visit date not found    Last OARRS:        No data to display

## 2024-04-19 NOTE — TELEPHONE ENCOUNTER
Medication:   Requested Prescriptions     Pending Prescriptions Disp Refills    metoprolol tartrate (LOPRESSOR) 25 MG tablet [Pharmacy Med Name: Metoprolol Tartrate 25 MG Oral Tablet] 180 tablet 0     Sig: Take 1 tablet by mouth twice daily       Last Filled:  2/14/24    Patient Phone Number: [phone] (home)     Last appt: 10/2/2023   Next appt: Visit date not found    Last Labs DM:   Lab Results   Component Value Date/Time    LABA1C 4.9 09/19/2023 08:18 PM     Last Lipid:   Lab Results   Component Value Date/Time    CHOL 218 04/18/2023 12:17 PM    TRIG 172 04/18/2023 12:17 PM    HDL 49 04/18/2023 12:17 PM    HDL 49 12/28/2011 09:54 AM    LDLCALC 135 04/18/2023 12:17 PM     Last PSA: No results found for: \"PSA\"  Last Thyroid:   Lab Results   Component Value Date/Time    TSH 1.01 04/18/2023 12:17 PM    FT3 3.1 10/15/2012 08:56 AM    U3AJLZI 1.14 10/22/2021 11:12 AM    T4FREE 2.1 03/16/2023 10:55 AM    X5AMMAY 5.2 08/19/2010 01:10 PM

## 2024-05-13 RX ORDER — ONDANSETRON 8 MG/1
TABLET, ORALLY DISINTEGRATING ORAL
Qty: 30 TABLET | Refills: 2 | Status: SHIPPED | OUTPATIENT
Start: 2024-05-13

## 2024-05-13 NOTE — TELEPHONE ENCOUNTER
Medication:   Requested Prescriptions     Pending Prescriptions Disp Refills    metoprolol tartrate (LOPRESSOR) 25 MG tablet [Pharmacy Med Name: Metoprolol Tartrate 25 MG Oral Tablet] 180 tablet 0     Sig: Take 1 tablet by mouth twice daily        Last Filled:  4/19/24    Patient Phone Number: [phone] (home)     Last appt: 10/2/2023   Next appt: Visit date not found    Last OARRS:        No data to display

## 2024-05-13 NOTE — TELEPHONE ENCOUNTER
Medication:   Requested Prescriptions     Pending Prescriptions Disp Refills    ondansetron (ZOFRAN-ODT) 8 MG TBDP disintegrating tablet [Pharmacy Med Name: ONDANSETRON ODT 8MG TABLETS] 30 tablet      Sig: DISSOLVE 1 TABLET UNDER THE TONGUE EVERY 8 HOURS AS NEEDED FOR NAUSEA OR VOMITING       Last Filled:  3/15/24    Patient Phone Number: [phone] (home)     Last appt: 10/2/2023   Next appt: Visit date not found    Last Labs DM:   Lab Results   Component Value Date/Time    LABA1C 4.9 09/19/2023 08:18 PM     Last Lipid:   Lab Results   Component Value Date/Time    CHOL 218 04/18/2023 12:17 PM    TRIG 172 04/18/2023 12:17 PM    HDL 49 04/18/2023 12:17 PM    HDL 49 12/28/2011 09:54 AM     Last PSA: No results found for: \"PSA\"  Last Thyroid:   Lab Results   Component Value Date/Time    TSH 1.01 04/18/2023 12:17 PM    TSH 0.09 03/16/2023 10:55 AM    FT3 3.1 10/15/2012 08:56 AM    J8OVNYV 1.14 10/22/2021 11:12 AM    T4FREE 2.1 03/16/2023 10:55 AM    L4DQSEP 5.2 08/19/2010 01:10 PM

## 2024-05-30 NOTE — TELEPHONE ENCOUNTER
Medication:   Requested Prescriptions     Pending Prescriptions Disp Refills    ondansetron (ZOFRAN-ODT) 8 MG TBDP disintegrating tablet [Pharmacy Med Name: Ondansetron 8 MG Oral Tablet Disintegrating] 9 tablet 0     Sig: DISSOLVE 1 TABLET IN MOUTH EVERY 8 HOURS AS NEEDED FOR NAUSEA AND FOR VOMITING        Last Filled:  5/13/24    Patient Phone Number: [phone] (home)     Last appt: 10/2/2023   Next appt: Visit date not found    Last OARRS:        No data to display

## 2024-05-31 RX ORDER — ONDANSETRON 8 MG/1
TABLET, ORALLY DISINTEGRATING ORAL
Qty: 9 TABLET | Refills: 0 | Status: SHIPPED | OUTPATIENT
Start: 2024-05-31

## 2024-06-13 NOTE — TELEPHONE ENCOUNTER
Medication:   Requested Prescriptions     Pending Prescriptions Disp Refills    ondansetron (ZOFRAN-ODT) 8 MG TBDP disintegrating tablet [Pharmacy Med Name: Ondansetron 8 MG Oral Tablet Disintegrating] 9 tablet 0     Sig: DISSOLVE 1 TABLET IN MOUTH EVERY 8 HOURS AS NEEDED FOR NAUSEA AND FOR VOMITING       Last Filled:  5/31/24    Patient Phone Number: [phone] (home)     Last appt: 10/2/2023   Next appt: Visit date not found    Last Labs DM:   Lab Results   Component Value Date/Time    LABA1C 4.9 09/19/2023 08:18 PM     Last Lipid:   Lab Results   Component Value Date/Time    CHOL 218 04/18/2023 12:17 PM    TRIG 172 04/18/2023 12:17 PM    HDL 49 04/18/2023 12:17 PM    HDL 49 12/28/2011 09:54 AM     Last PSA: No results found for: \"PSA\"  Last Thyroid:   Lab Results   Component Value Date/Time    TSH 1.01 04/18/2023 12:17 PM    TSH 0.09 03/16/2023 10:55 AM    FT3 3.1 10/15/2012 08:56 AM    X2XTAXD 1.14 10/22/2021 11:12 AM    T4FREE 2.1 03/16/2023 10:55 AM    E3IBPHB 5.2 08/19/2010 01:10 PM

## 2024-06-14 RX ORDER — ONDANSETRON 8 MG/1
TABLET, ORALLY DISINTEGRATING ORAL
Qty: 9 TABLET | Refills: 0 | Status: SHIPPED | OUTPATIENT
Start: 2024-06-14

## 2024-07-04 NOTE — TELEPHONE ENCOUNTER
Medication:   Requested Prescriptions     Pending Prescriptions Disp Refills    dilTIAZem (CARDIZEM CD) 360 MG extended release capsule 90 capsule 1     Sig: Take 1 capsule by mouth daily        Last Filled:  5/8/23    Patient Phone Number: [phone] (home)     Last appt: 10/2/2023   Next appt: Visit date not found    Last OARRS:        No data to display                  
Epidural Note. Patient sitting. Lumbar epidural performed at L3-4. Pulse oximetry, NIBP, FHRM in place. Patient sitting. Sterile gloves, Chloro-prep. 1% lidocaine for local infiltration. 1 Attempt. NORA to Air 7 cm. 27g spinal needle inserted. + CSF. Denies paresthesia. Spinal needle removed. Flexitip Catheter threaded easily to 20cm. 17g Touhy needle removed. Epidural catheter pulled back and secured in place at 12  cm. Negative aspiration for CSF and blood. Test dose consisting of 3ml 1.5% lidocaine with epinephrine was negative. Remaining 2ml of test dose consisting of 1.5% lidocaine with epinephrine. Patient tolerated procedure and was hemodynamically stable throughout. 10 cc .125% bupivacaine epidural.  T10 level bilaterally. Uncomplicated procedure. Covering attending physician aware. Vitals per nursing epidural protocol.

## 2024-07-17 RX ORDER — LEVOTHYROXINE SODIUM 0.1 MG/1
100 TABLET ORAL DAILY
Qty: 150 TABLET | Refills: 0 | Status: SHIPPED | OUTPATIENT
Start: 2024-07-17

## 2024-07-17 NOTE — TELEPHONE ENCOUNTER
Medication:   Requested Prescriptions     Pending Prescriptions Disp Refills    levothyroxine (SYNTHROID) 100 MCG tablet [Pharmacy Med Name: Levothyroxine Sodium 100 MCG Oral Tablet] 150 tablet 0     Sig: Take 1 tablet by mouth once daily        Last Filled:  3/13/24    Patient Phone Number: [phone] (home)     Last appt: 10/2/2023   Next appt: Visit date not found    Last OARRS:        No data to display

## 2024-09-18 RX ORDER — METOPROLOL TARTRATE 25 MG/1
TABLET, FILM COATED ORAL
Qty: 180 TABLET | Refills: 0 | Status: SHIPPED | OUTPATIENT
Start: 2024-09-18

## 2024-09-20 RX ORDER — LEVOTHYROXINE SODIUM 100 UG/1
100 TABLET ORAL DAILY
Qty: 30 TABLET | Refills: 0 | Status: SHIPPED | OUTPATIENT
Start: 2024-09-20

## 2024-10-09 RX ORDER — LEVOTHYROXINE SODIUM 100 UG/1
100 TABLET ORAL DAILY
Qty: 30 TABLET | Refills: 0 | Status: SHIPPED | OUTPATIENT
Start: 2024-10-09

## 2024-10-09 NOTE — TELEPHONE ENCOUNTER
Medication:   Requested Prescriptions     Pending Prescriptions Disp Refills    levothyroxine (SYNTHROID) 100 MCG tablet [Pharmacy Med Name: Levothyroxine Sodium 100 MCG Oral Tablet] 30 tablet 0     Sig: Take 1 tablet by mouth once daily       Last Filled:  9/20/24    Patient Phone Number: [phone] (home)     Last appt: 10/2/2023   Next appt: 10/23/2024    Last Labs DM:   Lab Results   Component Value Date/Time    LABA1C 4.9 09/19/2023 08:18 PM     Last Lipid:   Lab Results   Component Value Date/Time    CHOL 218 04/18/2023 12:17 PM    TRIG 172 04/18/2023 12:17 PM    HDL 49 04/18/2023 12:17 PM    HDL 49 12/28/2011 09:54 AM     Last PSA: No results found for: \"PSA\"  Last Thyroid:   Lab Results   Component Value Date/Time    TSH 1.01 04/18/2023 12:17 PM    TSH 0.09 03/16/2023 10:55 AM    FT3 3.1 10/15/2012 08:56 AM    B9KBVVM 1.14 10/22/2021 11:12 AM    T4FREE 2.1 03/16/2023 10:55 AM

## 2024-10-20 SDOH — ECONOMIC STABILITY: FOOD INSECURITY: WITHIN THE PAST 12 MONTHS, THE FOOD YOU BOUGHT JUST DIDN'T LAST AND YOU DIDN'T HAVE MONEY TO GET MORE.: NEVER TRUE

## 2024-10-20 SDOH — ECONOMIC STABILITY: FOOD INSECURITY: WITHIN THE PAST 12 MONTHS, YOU WORRIED THAT YOUR FOOD WOULD RUN OUT BEFORE YOU GOT MONEY TO BUY MORE.: NEVER TRUE

## 2024-10-20 SDOH — ECONOMIC STABILITY: INCOME INSECURITY: HOW HARD IS IT FOR YOU TO PAY FOR THE VERY BASICS LIKE FOOD, HOUSING, MEDICAL CARE, AND HEATING?: SOMEWHAT HARD

## 2024-10-23 ENCOUNTER — OFFICE VISIT (OUTPATIENT)
Dept: FAMILY MEDICINE CLINIC | Age: 64
End: 2024-10-23
Payer: MEDICARE

## 2024-10-23 VITALS
HEIGHT: 63 IN | DIASTOLIC BLOOD PRESSURE: 71 MMHG | BODY MASS INDEX: 30.83 KG/M2 | OXYGEN SATURATION: 98 % | WEIGHT: 174 LBS | SYSTOLIC BLOOD PRESSURE: 118 MMHG | HEART RATE: 61 BPM

## 2024-10-23 DIAGNOSIS — I10 PRIMARY HYPERTENSION: ICD-10-CM

## 2024-10-23 DIAGNOSIS — M25.562 CHRONIC PAIN OF BOTH KNEES: ICD-10-CM

## 2024-10-23 DIAGNOSIS — R73.03 PREDIABETES: ICD-10-CM

## 2024-10-23 DIAGNOSIS — D64.9 ANEMIA, UNSPECIFIED TYPE: ICD-10-CM

## 2024-10-23 DIAGNOSIS — R19.7 DIARRHEA, UNSPECIFIED TYPE: ICD-10-CM

## 2024-10-23 DIAGNOSIS — G89.29 CHRONIC PAIN OF BOTH KNEES: ICD-10-CM

## 2024-10-23 DIAGNOSIS — M25.561 CHRONIC PAIN OF BOTH KNEES: ICD-10-CM

## 2024-10-23 DIAGNOSIS — E03.9 ACQUIRED HYPOTHYROIDISM: Primary | ICD-10-CM

## 2024-10-23 PROCEDURE — 99214 OFFICE O/P EST MOD 30 MIN: CPT | Performed by: FAMILY MEDICINE

## 2024-10-23 PROCEDURE — 3074F SYST BP LT 130 MM HG: CPT | Performed by: FAMILY MEDICINE

## 2024-10-23 PROCEDURE — G8484 FLU IMMUNIZE NO ADMIN: HCPCS | Performed by: FAMILY MEDICINE

## 2024-10-23 PROCEDURE — 1123F ACP DISCUSS/DSCN MKR DOCD: CPT | Performed by: FAMILY MEDICINE

## 2024-10-23 PROCEDURE — G8427 DOCREV CUR MEDS BY ELIG CLIN: HCPCS | Performed by: FAMILY MEDICINE

## 2024-10-23 PROCEDURE — 1036F TOBACCO NON-USER: CPT | Performed by: FAMILY MEDICINE

## 2024-10-23 PROCEDURE — G8399 PT W/DXA RESULTS DOCUMENT: HCPCS | Performed by: FAMILY MEDICINE

## 2024-10-23 PROCEDURE — G8417 CALC BMI ABV UP PARAM F/U: HCPCS | Performed by: FAMILY MEDICINE

## 2024-10-23 PROCEDURE — 1090F PRES/ABSN URINE INCON ASSESS: CPT | Performed by: FAMILY MEDICINE

## 2024-10-23 PROCEDURE — 3078F DIAST BP <80 MM HG: CPT | Performed by: FAMILY MEDICINE

## 2024-10-23 PROCEDURE — 3017F COLORECTAL CA SCREEN DOC REV: CPT | Performed by: FAMILY MEDICINE

## 2024-10-23 RX ORDER — MONTELUKAST SODIUM 4 MG/1
2 TABLET, CHEWABLE ORAL 2 TIMES DAILY
Qty: 120 TABLET | Refills: 2 | Status: SHIPPED | OUTPATIENT
Start: 2024-10-23

## 2024-10-23 SDOH — ECONOMIC STABILITY: FOOD INSECURITY: WITHIN THE PAST 12 MONTHS, THE FOOD YOU BOUGHT JUST DIDN'T LAST AND YOU DIDN'T HAVE MONEY TO GET MORE.: NEVER TRUE

## 2024-10-23 SDOH — ECONOMIC STABILITY: FOOD INSECURITY: WITHIN THE PAST 12 MONTHS, YOU WORRIED THAT YOUR FOOD WOULD RUN OUT BEFORE YOU GOT MONEY TO BUY MORE.: NEVER TRUE

## 2024-10-23 SDOH — ECONOMIC STABILITY: INCOME INSECURITY: HOW HARD IS IT FOR YOU TO PAY FOR THE VERY BASICS LIKE FOOD, HOUSING, MEDICAL CARE, AND HEATING?: NOT HARD AT ALL

## 2024-10-23 ASSESSMENT — PATIENT HEALTH QUESTIONNAIRE - PHQ9
SUM OF ALL RESPONSES TO PHQ QUESTIONS 1-9: 0
2. FEELING DOWN, DEPRESSED OR HOPELESS: NOT AT ALL
SUM OF ALL RESPONSES TO PHQ QUESTIONS 1-9: 0
1. LITTLE INTEREST OR PLEASURE IN DOING THINGS: NOT AT ALL
SUM OF ALL RESPONSES TO PHQ QUESTIONS 1-9: 0
SUM OF ALL RESPONSES TO PHQ QUESTIONS 1-9: 0
SUM OF ALL RESPONSES TO PHQ9 QUESTIONS 1 & 2: 0

## 2024-10-23 NOTE — PROGRESS NOTES
Nilda Lui (:  1960) is a 64 y.o. female,Established patient, here for evaluation of the following chief complaint(s):  Follow-up and Leg Pain      Assessment & Plan   ASSESSMENT/PLAN:  Nilda was seen today for follow-up and leg pain.    Diagnoses and all orders for this visit:    Acquired hypothyroidism  -     TSH; Future  Stable on levo  Prediabetes  -     Hemoglobin A1C; Future  Stable with diet  Anemia, unspecified type  -     CBC; Future  -     Ferritin; Future  Recheck to see if stable  Primary hypertension  -     Lipid Panel; Future  -     Comprehensive Metabolic Panel; Future  The current medical regimen is effective;  continue present plan and medications.   Chronic pain of both knees  -     XR KNEE RIGHT (3 VIEWS); Future  -     XR KNEE LEFT (3 VIEWS); Future  Xray to evalaute  diarrhea  -     colestipol (COLESTID) 1 g tablet; Take 2 tablets by mouth 2 times daily  Likely bile salt induced.  Can try colestid since didn't tolerate cholestyramine       No follow-ups on file.         Subjective   SUBJECTIVE/OBJECTIVE:  HPI  Pt is a of 64 y.o. female comes in today with   Chief Complaint   Patient presents with    Follow-up    Leg Pain     Having a lot of leg pain from the knees down. Worse at night.  A little stiffness in the am.  Has been going on for a few months.  Taking a lot of tylenol. Not using nsaids.  No swelling.    Review of Systems       Objective   Physical Exam  Constitutional:       General: She is not in acute distress.     Appearance: She is well-developed. She is not diaphoretic.   HENT:      Head: Normocephalic and atraumatic.   Eyes:      General: No scleral icterus.  Neck:      Thyroid: No thyroid mass or thyromegaly.      Trachea: No tracheal deviation.   Cardiovascular:      Rate and Rhythm: Normal rate and regular rhythm.      Heart sounds: Normal heart sounds. No murmur heard.  Pulmonary:      Effort: Pulmonary effort is normal.      Breath sounds: Normal breath

## 2024-10-24 ENCOUNTER — HOSPITAL ENCOUNTER (OUTPATIENT)
Dept: GENERAL RADIOLOGY | Age: 64
Discharge: HOME | End: 2024-10-24
Payer: MEDICARE

## 2024-10-24 DIAGNOSIS — G89.29 CHRONIC PAIN OF BOTH KNEES: ICD-10-CM

## 2024-10-24 DIAGNOSIS — M25.561 CHRONIC PAIN OF BOTH KNEES: ICD-10-CM

## 2024-10-24 DIAGNOSIS — E03.9 ACQUIRED HYPOTHYROIDISM: ICD-10-CM

## 2024-10-24 DIAGNOSIS — R73.03 PREDIABETES: ICD-10-CM

## 2024-10-24 DIAGNOSIS — D64.9 ANEMIA, UNSPECIFIED TYPE: ICD-10-CM

## 2024-10-24 DIAGNOSIS — M25.562 CHRONIC PAIN OF BOTH KNEES: ICD-10-CM

## 2024-10-24 DIAGNOSIS — I10 PRIMARY HYPERTENSION: ICD-10-CM

## 2024-10-24 LAB
ALBUMIN SERPL-MCNC: 4.4 G/DL (ref 3.4–5)
ALBUMIN/GLOB SERPL: 2.1 {RATIO} (ref 1.1–2.2)
ALP SERPL-CCNC: 88 U/L (ref 40–129)
ALT SERPL-CCNC: 12 U/L (ref 10–40)
ANION GAP SERPL CALCULATED.3IONS-SCNC: 10 MMOL/L (ref 3–16)
AST SERPL-CCNC: 14 U/L (ref 15–37)
BILIRUB SERPL-MCNC: <0.2 MG/DL (ref 0–1)
BUN SERPL-MCNC: 25 MG/DL (ref 7–20)
CALCIUM SERPL-MCNC: 9.9 MG/DL (ref 8.3–10.6)
CHLORIDE SERPL-SCNC: 103 MMOL/L (ref 99–110)
CHOLEST SERPL-MCNC: 239 MG/DL (ref 0–199)
CO2 SERPL-SCNC: 27 MMOL/L (ref 21–32)
CREAT SERPL-MCNC: 1.4 MG/DL (ref 0.6–1.2)
DEPRECATED RDW RBC AUTO: 13.9 % (ref 12.4–15.4)
FERRITIN SERPL IA-MCNC: 25.2 NG/ML (ref 15–150)
GFR SERPLBLD CREATININE-BSD FMLA CKD-EPI: 42 ML/MIN/{1.73_M2}
GLUCOSE SERPL-MCNC: 84 MG/DL (ref 70–99)
HCT VFR BLD AUTO: 35.3 % (ref 36–48)
HDLC SERPL-MCNC: 74 MG/DL (ref 40–60)
HGB BLD-MCNC: 12 G/DL (ref 12–16)
LDLC SERPL CALC-MCNC: 149 MG/DL
MCH RBC QN AUTO: 31.8 PG (ref 26–34)
MCHC RBC AUTO-ENTMCNC: 34 G/DL (ref 31–36)
MCV RBC AUTO: 93.5 FL (ref 80–100)
PLATELET # BLD AUTO: 367 K/UL (ref 135–450)
PMV BLD AUTO: 7.8 FL (ref 5–10.5)
POTASSIUM SERPL-SCNC: 4.6 MMOL/L (ref 3.5–5.1)
PROT SERPL-MCNC: 6.5 G/DL (ref 6.4–8.2)
RBC # BLD AUTO: 3.78 M/UL (ref 4–5.2)
SODIUM SERPL-SCNC: 140 MMOL/L (ref 136–145)
TRIGL SERPL-MCNC: 80 MG/DL (ref 0–150)
TSH SERPL DL<=0.005 MIU/L-ACNC: 3.67 UIU/ML (ref 0.27–4.2)
VLDLC SERPL CALC-MCNC: 16 MG/DL
WBC # BLD AUTO: 7.8 K/UL (ref 4–11)

## 2024-10-24 PROCEDURE — 73562 X-RAY EXAM OF KNEE 3: CPT

## 2024-10-25 LAB
EST. AVERAGE GLUCOSE BLD GHB EST-MCNC: 96.8 MG/DL
HBA1C MFR BLD: 5 %

## 2024-10-30 RX ORDER — DILTIAZEM HYDROCHLORIDE 360 MG/1
360 CAPSULE, EXTENDED RELEASE ORAL DAILY
Qty: 90 CAPSULE | Refills: 1 | Status: SHIPPED | OUTPATIENT
Start: 2024-10-30

## 2024-10-30 RX ORDER — DILTIAZEM HYDROCHLORIDE 360 MG/1
360 CAPSULE, EXTENDED RELEASE ORAL DAILY
Qty: 30 CAPSULE | Refills: 0 | Status: SHIPPED | OUTPATIENT
Start: 2024-10-30 | End: 2024-10-30

## 2024-10-30 NOTE — TELEPHONE ENCOUNTER
Medication:   Requested Prescriptions     Pending Prescriptions Disp Refills    dilTIAZem (TIAZAC) 360 MG extended release capsule [Pharmacy Med Name: dilTIAZem HCl ER Beads 360 MG Oral Capsule Extended Release 24 Hour] 30 capsule 0     Sig: Take 1 capsule by mouth once daily       Last Filled:  12/28/23    Patient Phone Number: [phone] (home)     Last appt: 10/23/2024   Next appt: Visit date not found    Last Labs DM:   Lab Results   Component Value Date/Time    LABA1C 5.0 10/24/2024 09:40 AM     Last Lipid:   Lab Results   Component Value Date/Time    CHOL 239 10/24/2024 09:40 AM    TRIG 80 10/24/2024 09:40 AM    HDL 74 10/24/2024 09:40 AM    HDL 49 12/28/2011 09:54 AM     Last PSA: No results found for: \"PSA\"  Last Thyroid:   Lab Results   Component Value Date/Time    TSH 3.67 10/24/2024 09:40 AM    TSH 0.09 03/16/2023 10:55 AM    FT3 3.1 10/15/2012 08:56 AM    V7GKMZM 1.14 10/22/2021 11:12 AM    T4FREE 2.1 03/16/2023 10:55 AM

## 2024-11-04 DIAGNOSIS — M17.10 ARTHRITIS OF KNEE: Primary | ICD-10-CM

## 2024-11-12 DIAGNOSIS — G89.29 CHRONIC PAIN OF BOTH KNEES: Primary | ICD-10-CM

## 2024-11-12 DIAGNOSIS — M25.562 CHRONIC PAIN OF BOTH KNEES: Primary | ICD-10-CM

## 2024-11-12 DIAGNOSIS — M25.561 CHRONIC PAIN OF BOTH KNEES: Primary | ICD-10-CM

## 2024-11-13 NOTE — TELEPHONE ENCOUNTER
Medication:   Requested Prescriptions     Pending Prescriptions Disp Refills    levothyroxine (SYNTHROID) 100 MCG tablet [Pharmacy Med Name: Levothyroxine Sodium 100 MCG Oral Tablet] 30 tablet 0     Sig: Take 1 tablet by mouth once daily     10/9/24  Last Filled:      Patient Phone Number: [phone] (home)     Last appt: 10/23/2024   Next appt: Visit date not found    Last Labs DM:   Lab Results   Component Value Date/Time    LABA1C 5.0 10/24/2024 09:40 AM     Last Lipid:   Lab Results   Component Value Date/Time    CHOL 239 10/24/2024 09:40 AM    TRIG 80 10/24/2024 09:40 AM    HDL 74 10/24/2024 09:40 AM    HDL 49 12/28/2011 09:54 AM     Last PSA: No results found for: \"PSA\"  Last Thyroid:   Lab Results   Component Value Date/Time    TSH 3.67 10/24/2024 09:40 AM    TSH 0.09 03/16/2023 10:55 AM    FT3 3.1 10/15/2012 08:56 AM    A9ULGNB 1.14 10/22/2021 11:12 AM    T4FREE 2.1 03/16/2023 10:55 AM

## 2024-11-14 RX ORDER — LEVOTHYROXINE SODIUM 100 UG/1
100 TABLET ORAL DAILY
Qty: 90 TABLET | Refills: 1 | Status: SHIPPED | OUTPATIENT
Start: 2024-11-14

## 2024-12-04 NOTE — TELEPHONE ENCOUNTER
Medication:   Requested Prescriptions     Pending Prescriptions Disp Refills    ondansetron (ZOFRAN-ODT) 8 MG TBDP disintegrating tablet [Pharmacy Med Name: Ondansetron 8 MG Oral Tablet Disintegrating] 9 tablet 0     Sig: DISSOLVE 1 TABLET IN MOUTH EVERY 8 HOURS AS NEEDED FOR NAUSEA AND FOR VOMITING       Last Filled:  6/14/24    Patient Phone Number: [phone] (home)     Last appt: 10/23/2024   Next appt: Visit date not found    Last Labs DM:   Lab Results   Component Value Date/Time    LABA1C 5.0 10/24/2024 09:40 AM     Last Lipid:   Lab Results   Component Value Date/Time    CHOL 239 10/24/2024 09:40 AM    TRIG 80 10/24/2024 09:40 AM    HDL 74 10/24/2024 09:40 AM    HDL 49 12/28/2011 09:54 AM     Last PSA: No results found for: \"PSA\"  Last Thyroid:   Lab Results   Component Value Date/Time    TSH 3.67 10/24/2024 09:40 AM    TSH 0.09 03/16/2023 10:55 AM    FT3 3.1 10/15/2012 08:56 AM    U1RJGDP 1.14 10/22/2021 11:12 AM    T4FREE 2.1 03/16/2023 10:55 AM

## 2024-12-05 RX ORDER — ONDANSETRON 8 MG/1
TABLET, ORALLY DISINTEGRATING ORAL
Qty: 9 TABLET | Refills: 0 | Status: SHIPPED | OUTPATIENT
Start: 2024-12-05

## 2025-02-20 NOTE — TELEPHONE ENCOUNTER
Medication:   Requested Prescriptions     Pending Prescriptions Disp Refills    ondansetron (ZOFRAN-ODT) 8 MG TBDP disintegrating tablet [Pharmacy Med Name: Ondansetron 8 MG Oral Tablet Disintegrating] 9 tablet 0     Sig: DISSOLVE 1 TABLET IN MOUTH EVERY 8 HOURS AS NEEDED FOR NAUSEA AND FOR VOMITING        Last Filled:  12/5/24    Pended to: [facility] [address] [place] [address] [place] - P [phone] - F [phone]     Patient Phone Number: [phone] (home)     Last appt: 10/23/2024   Next appt: Visit date not found    Last OARRS:        No data to display

## 2025-02-21 RX ORDER — ONDANSETRON 8 MG/1
TABLET, ORALLY DISINTEGRATING ORAL
Qty: 9 TABLET | Refills: 2 | Status: SHIPPED | OUTPATIENT
Start: 2025-02-21

## 2025-03-06 LAB
ALBUMIN: 4.2 G/DL (ref 3.5–5)
ANION GAP SERPL CALCULATED.3IONS-SCNC: 8 MMOL/L (ref 5–13)
BILIRUBIN, URINE: NEGATIVE
BLOOD, URINE: NEGATIVE
BUN / CREAT RATIO: 15
BUN BLDV-MCNC: 20 MG/DL (ref 7–25)
CALCIUM SERPL-MCNC: 9.4 MG/DL (ref 8.5–10.5)
CHLORIDE BLD-SCNC: 103 MMOL/L (ref 98–110)
CLARITY, UA: CLEAR
CO2: 27 MMOL/L (ref 22–29)
COLOR, UA: YELLOW
CREAT SERPL-MCNC: 1.33 MG/DL (ref 0.5–1.2)
CREATINE URINE MG/24 HR: 101.4 MG/DL (ref 20–310)
EGFR (CKD-EPI): 44 SEE NOTE
EPITHELIAL CELLS, UA: 1 /HPF (ref 0–5)
GLUCOSE BLD-MCNC: 91 MG/DL (ref 71–99)
GLUCOSE URINE: NEGATIVE MG/DL
KETONES, URINE: NEGATIVE MG/DL
LEUKOCYTE ESTERASE, URINE: NEGATIVE
LEUKOCYTES, UA: 1 /HPF (ref 0–5)
MICROALBUMIN/CREAT 24H UR: 7.5 MG/DL
NITRITE, URINE: NEGATIVE
PH, URINE: 6.5 (ref 5–8)
PHOSPHORUS: 4.4 MG/DL (ref 2.1–4.3)
POTASSIUM SERPL-SCNC: 4.5 MMOL/L (ref 3.5–5.1)
PROTEIN UA: 10 MG/DL
PROTEIN/CREAT RATIO URINE RAN: 74 MG/G CREAT (ref 0–200)
RBC UA: 3 /HPF (ref 0–3)
SODIUM BLD-SCNC: 138 MMOL/L (ref 135–146)
SPECIFIC GRAVITY UA: 1.02 (ref 1–1.03)
URIC ACID: 5.9 MG/DL (ref 2.6–6)
UROBILINOGEN, URINE: <2 MG/DL

## 2025-04-11 RX ORDER — COLESTIPOL HYDROCHLORIDE 1 G/1
2 TABLET ORAL 2 TIMES DAILY
Qty: 120 TABLET | Refills: 0 | Status: SHIPPED | OUTPATIENT
Start: 2025-04-11

## 2025-04-11 RX ORDER — METOPROLOL TARTRATE 25 MG/1
25 TABLET, FILM COATED ORAL 2 TIMES DAILY
Qty: 180 TABLET | Refills: 0 | Status: SHIPPED | OUTPATIENT
Start: 2025-04-11

## 2025-04-11 NOTE — TELEPHONE ENCOUNTER
Medication:   Requested Prescriptions     Pending Prescriptions Disp Refills    metoprolol tartrate (LOPRESSOR) 25 MG tablet [Pharmacy Med Name: Metoprolol Tartrate 25 MG Oral Tablet] 180 tablet 0     Sig: Take 1 tablet by mouth twice daily    colestipol (COLESTID) 1 g tablet [Pharmacy Med Name: Colestipol HCl 1 GM Oral Tablet] 120 tablet 0     Sig: Take 2 tablets by mouth twice daily        Last Filled:      Patient Phone Number: [phone] (home)     Last appt: 10/23/2024   Next appt: Visit date not found    Last OARRS:        No data to display

## 2025-05-02 RX ORDER — DILTIAZEM HYDROCHLORIDE 360 MG/1
360 CAPSULE, EXTENDED RELEASE ORAL DAILY
Qty: 90 CAPSULE | Refills: 0 | Status: SHIPPED | OUTPATIENT
Start: 2025-05-02

## 2025-05-02 NOTE — TELEPHONE ENCOUNTER
Medication:   Requested Prescriptions     Pending Prescriptions Disp Refills    dilTIAZem (CARDIZEM CD) 360 MG extended release capsule [Pharmacy Med Name: dilTIAZem HCl ER Coated Beads 360 MG Oral Capsule Extended Release 24 Hour] 90 capsule 0     Sig: Take 1 capsule by mouth once daily     Last Filled:  # 90 with 1 refill on 10/30/24    Last appt: 10/23/2024   Next appt: Visit date not found    Last OARRS:        No data to display

## 2025-05-16 NOTE — TELEPHONE ENCOUNTER
Medication:   Requested Prescriptions     Pending Prescriptions Disp Refills    levothyroxine (SYNTHROID) 100 MCG tablet [Pharmacy Med Name: Levothyroxine Sodium 100 MCG Oral Tablet] 90 tablet 0     Sig: Take 1 tablet by mouth once daily       Last Filled:  11/14/24    Patient Phone Number: [phone] (home)     Last appt: 10/23/2024   Next appt: Visit date not found    Last Thyroid:   Lab Results   Component Value Date/Time    TSH 3.67 10/24/2024 09:40 AM    TSH 0.09 03/16/2023 10:55 AM    FT3 3.1 10/15/2012 08:56 AM    R6THACW 1.14 10/22/2021 11:12 AM    T4FREE 2.1 03/16/2023 10:55 AM

## 2025-05-19 RX ORDER — LEVOTHYROXINE SODIUM 100 UG/1
100 TABLET ORAL DAILY
Qty: 90 TABLET | Refills: 0 | Status: SHIPPED | OUTPATIENT
Start: 2025-05-19

## 2025-07-18 RX ORDER — DILTIAZEM HYDROCHLORIDE 360 MG/1
360 CAPSULE, EXTENDED RELEASE ORAL DAILY
Qty: 90 CAPSULE | Refills: 0 | Status: SHIPPED | OUTPATIENT
Start: 2025-07-18

## 2025-07-18 NOTE — TELEPHONE ENCOUNTER
Medication:   Requested Prescriptions     Pending Prescriptions Disp Refills    dilTIAZem (CARDIZEM CD) 360 MG extended release capsule [Pharmacy Med Name: dilTIAZem HCl ER Coated Beads 360 MG Oral Capsule Extended Release 24 Hour] 90 capsule 0     Sig: Take 1 capsule by mouth once daily     Last Filled:  # 90 05/02/2025    Last appt: 10/23/2024   Next appt: Visit date not found    Last OARRS:        No data to display

## 2025-09-03 ENCOUNTER — OFFICE VISIT (OUTPATIENT)
Dept: FAMILY MEDICINE CLINIC | Age: 65
End: 2025-09-03
Payer: MEDICARE

## 2025-09-03 VITALS
OXYGEN SATURATION: 98 % | BODY MASS INDEX: 38.64 KG/M2 | WEIGHT: 210 LBS | TEMPERATURE: 97.8 F | HEART RATE: 67 BPM | HEIGHT: 62 IN | DIASTOLIC BLOOD PRESSURE: 72 MMHG | SYSTOLIC BLOOD PRESSURE: 106 MMHG

## 2025-09-03 DIAGNOSIS — R73.03 PREDIABETES: ICD-10-CM

## 2025-09-03 DIAGNOSIS — Z00.00 WELCOME TO MEDICARE PREVENTIVE VISIT: Primary | ICD-10-CM

## 2025-09-03 DIAGNOSIS — I10 PRIMARY HYPERTENSION: ICD-10-CM

## 2025-09-03 DIAGNOSIS — E03.9 ACQUIRED HYPOTHYROIDISM: ICD-10-CM

## 2025-09-03 DIAGNOSIS — R11.0 NAUSEA: ICD-10-CM

## 2025-09-03 DIAGNOSIS — R06.83 SNORING: ICD-10-CM

## 2025-09-03 PROCEDURE — 3017F COLORECTAL CA SCREEN DOC REV: CPT | Performed by: FAMILY MEDICINE

## 2025-09-03 PROCEDURE — G0402 INITIAL PREVENTIVE EXAM: HCPCS | Performed by: FAMILY MEDICINE

## 2025-09-03 PROCEDURE — 1159F MED LIST DOCD IN RCRD: CPT | Performed by: FAMILY MEDICINE

## 2025-09-03 PROCEDURE — 1123F ACP DISCUSS/DSCN MKR DOCD: CPT | Performed by: FAMILY MEDICINE

## 2025-09-03 PROCEDURE — 3074F SYST BP LT 130 MM HG: CPT | Performed by: FAMILY MEDICINE

## 2025-09-03 PROCEDURE — 3078F DIAST BP <80 MM HG: CPT | Performed by: FAMILY MEDICINE

## 2025-09-03 RX ORDER — HYDROCHLOROTHIAZIDE 12.5 MG/1
CAPSULE ORAL
COMMUNITY
Start: 2025-07-29

## 2025-09-03 RX ORDER — ROPINIROLE 0.5 MG/1
TABLET, FILM COATED ORAL
COMMUNITY
Start: 2025-08-19

## 2025-09-03 RX ORDER — METHYLPREDNISOLONE 4 MG/1
TABLET ORAL
Qty: 21 TABLET | Refills: 0 | Status: SHIPPED | OUTPATIENT
Start: 2025-09-03 | End: 2025-09-09

## 2025-09-03 RX ORDER — ONDANSETRON 8 MG/1
8 TABLET, ORALLY DISINTEGRATING ORAL EVERY 12 HOURS PRN
Qty: 9 TABLET | Refills: 2 | Status: SHIPPED | OUTPATIENT
Start: 2025-09-03

## 2025-09-03 RX ORDER — METHOCARBAMOL 750 MG/1
750 TABLET, FILM COATED ORAL 4 TIMES DAILY
Qty: 40 TABLET | Refills: 2 | Status: SHIPPED | OUTPATIENT
Start: 2025-09-03

## 2025-09-03 RX ORDER — NALTREXONE HYDROCHLORIDE 50 MG/1
50 TABLET, FILM COATED ORAL 2 TIMES DAILY
COMMUNITY
Start: 2025-08-06

## 2025-09-03 SDOH — ECONOMIC STABILITY: FOOD INSECURITY: WITHIN THE PAST 12 MONTHS, THE FOOD YOU BOUGHT JUST DIDN'T LAST AND YOU DIDN'T HAVE MONEY TO GET MORE.: NEVER TRUE

## 2025-09-03 SDOH — HEALTH STABILITY: PHYSICAL HEALTH: ON AVERAGE, HOW MANY MINUTES DO YOU ENGAGE IN EXERCISE AT THIS LEVEL?: 0 MIN

## 2025-09-03 SDOH — ECONOMIC STABILITY: FOOD INSECURITY: WITHIN THE PAST 12 MONTHS, YOU WORRIED THAT YOUR FOOD WOULD RUN OUT BEFORE YOU GOT MONEY TO BUY MORE.: NEVER TRUE

## 2025-09-03 SDOH — ECONOMIC STABILITY: INCOME INSECURITY: IN THE LAST 12 MONTHS, WAS THERE A TIME WHEN YOU WERE NOT ABLE TO PAY THE MORTGAGE OR RENT ON TIME?: NO

## 2025-09-03 SDOH — HEALTH STABILITY: PHYSICAL HEALTH: ON AVERAGE, HOW MANY DAYS PER WEEK DO YOU ENGAGE IN MODERATE TO STRENUOUS EXERCISE (LIKE A BRISK WALK)?: 0 DAYS

## 2025-09-03 ASSESSMENT — LIFESTYLE VARIABLES
HOW MANY STANDARD DRINKS CONTAINING ALCOHOL DO YOU HAVE ON A TYPICAL DAY: 1
HOW OFTEN DO YOU HAVE SIX OR MORE DRINKS ON ONE OCCASION: 1
HOW OFTEN DO YOU HAVE A DRINK CONTAINING ALCOHOL: MONTHLY OR LESS
HOW MANY STANDARD DRINKS CONTAINING ALCOHOL DO YOU HAVE ON A TYPICAL DAY: 1 OR 2
HOW OFTEN DO YOU HAVE A DRINK CONTAINING ALCOHOL: 2

## 2025-09-03 ASSESSMENT — PATIENT HEALTH QUESTIONNAIRE - PHQ9
2. FEELING DOWN, DEPRESSED OR HOPELESS: NOT AT ALL
SUM OF ALL RESPONSES TO PHQ QUESTIONS 1-9: 0
1. LITTLE INTEREST OR PLEASURE IN DOING THINGS: NOT AT ALL
SUM OF ALL RESPONSES TO PHQ QUESTIONS 1-9: 0